# Patient Record
Sex: FEMALE
[De-identification: names, ages, dates, MRNs, and addresses within clinical notes are randomized per-mention and may not be internally consistent; named-entity substitution may affect disease eponyms.]

---

## 2017-05-12 ENCOUNTER — HOSPITAL ENCOUNTER (EMERGENCY)
Dept: HOSPITAL 42 - ED | Age: 42
Discharge: HOME | End: 2017-05-12
Payer: SELF-PAY

## 2017-05-12 VITALS — SYSTOLIC BLOOD PRESSURE: 105 MMHG | DIASTOLIC BLOOD PRESSURE: 59 MMHG | HEART RATE: 69 BPM

## 2017-05-12 VITALS — BODY MASS INDEX: 17.5 KG/M2

## 2017-05-12 VITALS — RESPIRATION RATE: 16 BRPM | TEMPERATURE: 99 F | OXYGEN SATURATION: 100 %

## 2017-05-12 DIAGNOSIS — O03.9: Primary | ICD-10-CM

## 2017-05-12 LAB
ADD MANUAL DIFF?: NO
ALBUMIN/GLOB SERPL: 1.2 {RATIO} (ref 1.1–1.8)
ALP SERPL-CCNC: 43 U/L (ref 38–133)
ALT SERPL-CCNC: 28 U/L (ref 7–56)
APPEARANCE UR: CLEAR
AST SERPL-CCNC: 22 U/L (ref 15–39)
BACTERIA #/AREA URNS HPF: (no result) /[HPF]
BASOPHILS # BLD AUTO: 0.02 K/MM3 (ref 0–2)
BASOPHILS NFR BLD: 0.3 % (ref 0–3)
BILIRUB SERPL-MCNC: 1.2 MG/DL (ref 0.2–1.3)
BILIRUB UR-MCNC: NEGATIVE MG/DL
BUN SERPL-MCNC: 8 MG/DL (ref 7–21)
CALCIUM SERPL-MCNC: 8.9 MG/DL (ref 8.4–10.5)
CHLORIDE SERPL-SCNC: 100 MMOL/L (ref 98–107)
CO2 SERPL-SCNC: 31 MMOL/L (ref 21–33)
COLOR UR: (no result)
EOSINOPHIL # BLD: 0.1 10*3/UL (ref 0–0.7)
EOSINOPHIL NFR BLD: 0.9 % (ref 1.5–5)
ERYTHROCYTE [DISTWIDTH] IN BLOOD BY AUTOMATED COUNT: 12.2 % (ref 11.5–14.5)
GLOBULIN SER-MCNC: 3.5 GM/DL
GLUCOSE SERPL-MCNC: 93 MG/DL (ref 70–110)
GLUCOSE UR STRIP-MCNC: NEGATIVE MG/DL
GRANULOCYTES # BLD: 4.33 10*3/UL (ref 1.4–6.5)
GRANULOCYTES NFR BLD: 63.6 % (ref 50–68)
HCT VFR BLD CALC: 37.5 % (ref 36–48)
KETONES UR STRIP-MCNC: (no result) MG/DL
LEUKOCYTE ESTERASE UR-ACNC: NEGATIVE LEU/UL
LYMPHOCYTES # BLD: 1.8 10*3/UL (ref 1.2–3.4)
LYMPHOCYTES NFR BLD AUTO: 26.2 % (ref 22–35)
MCH RBC QN AUTO: 32.1 PG (ref 25–35)
MCHC RBC AUTO-ENTMCNC: 34.7 G/DL (ref 31–37)
MCV RBC AUTO: 92.6 FL (ref 80–105)
MONOCYTES # BLD AUTO: 0.6 10*3/UL (ref 0.1–0.6)
MONOCYTES NFR BLD: 9 % (ref 1–6)
PH UR STRIP: 6.5 [PH] (ref 4.7–8)
PLATELET # BLD: 206 10^3/UL (ref 120–450)
PMV BLD AUTO: 10.3 FL (ref 7–11)
POTASSIUM SERPL-SCNC: 3.9 MMOL/L (ref 3.6–5)
PROT SERPL-MCNC: 7.6 G/DL (ref 5.8–8.3)
PROT UR STRIP-MCNC: NEGATIVE MG/DL
RBC # UR STRIP: (no result) /UL
RBC #/AREA URNS HPF: (no result) /HPF (ref 0–2)
SODIUM SERPL-SCNC: 137 MMOL/L (ref 132–148)
SP GR UR STRIP: 1.01 (ref 1–1.03)
UROBILINOGEN UR STRIP-ACNC: 0.2 E.U./DL
WBC # BLD AUTO: 6.8 10^3/UL (ref 4.5–11)
WBC #/AREA URNS HPF: NEGATIVE /HPF (ref 0–6)

## 2017-05-12 NOTE — US
EXAM:

  US Pregnancy, Transvaginal



CLINICAL HISTORY:

  42 years old, female; Signs and symptoms; Lmp or gestational age (in weeks): 

03/31/2017; Other: Bleeding; Pregnant; Additional info: Pregnant, vaginal 

bleeding; per patient, positive home pregnancy test



TECHNIQUE:

  Real-time transvaginal obstetrical ultrasound of the maternal pelvis and a 

first trimester pregnancy with image documentation.  Transvaginal imaging was 

used for better evaluation of the fetus and adnexa.



EXAM DATE/TIME:

  5/12/2017 6:53 PM



COMPARISON:

  There are no prior studies for comparison.



FINDINGS:

  

Uterus: Uterus measures approximately 6.4 x 3.4 x 5 cm.  Cervix is closed.There 

is a nabothian cyst in the cervix.  Endometrium measures approximately 12 mm in 

width.  Endometrium is mildly heterogeneous.  There is no intrauterine 

gestation.



Right ovary: Right ovary measures approximately 2.73 x 1.28 x 1.44 cm.  There 

is a corpus luteum in the right ovary. There are multiple small follicles. 

There is expected blood flow on Doppler imaging



Left ovary: Left ovary measures approximately 2.75 x 1.2 x 1.33 cm.There are 

multiple small follicles.  There is intraovarian blood flow.



Adnexa: There are no adnexal masses.  There is no free fluid.



IMPRESSION:  Prominent endometrium, no intrauterine or ectopic gestation 

identified



Correlation with serial beta hCG levels advised

## 2017-05-12 NOTE — ED PDOC
Arrival/HPI





- General


Chief Complaint: Female Genitourinary


Time Seen by Provider: 17 18:53


Historian: Patient





- History of Present Illness


Narrative History of Present Illness (Text): 





17 21:20


Patient reports 2 day history of initially vaginal spotting however today 

developed vaginal bleeding with no clots, like her menstrual period, with no 

abdominal pain. Patient reports positive home pregnancy 1 week ago. Otherwise: (

-) N/V, (-) fever, (-) urinary symptoms, (-) prior salpingitis, (-) prior 

ectopic pregnancy. Has (-) prenatal care and (-) prior OB ultrasound. 





GYN HISTORY:   2 Para 0 AB 1 LNMP 3/31











Past Medical History





- Provider Review


Nursing Documentation Reviewed: Yes





- Infectious Disease


Hx of Infectious Diseases: None





- Tetanus Immunization


Tetanus Immunization: Unknown





- Past Medical History


Past Medical History: No Previous





- Cardiac


Hx Cardiac Disorders: No





- Pulmonary


Hx Respiratory Disorders: No





- Neurological


Hx Neurological Disorder: No





- HEENT


Hx HEENT Disorder: No


Hx Blind: No





- Renal


Hx Renal Disorder: No





- Endocrine/Metabolic


Hx Endocrine Disorders: No





- Hematological/Oncological


Hx Blood Disorders: No





- Integumentary


Hx Dermatological Disorder: No





- Musculoskeletal/Rheumatological


Hx Musculoskeletal Disorders: No





- Gastrointestinal


Hx Gastrointestinal Disorders: No





- Genitourinary/Gynecological


Hx Genitourinary Disorders: No





- Psychiatric


Hx Psychophysiologic Disorder: Yes


Hx Anxiety: Yes


Hx Depression: Yes


Hx Emotional Abuse: No


Hx Physical Abuse: No


Hx Substance Use: No





- Past Surgical History


Past Surgical History: No Previous





- Suicidal Assessment


Feels Threatened In Home Enviroment: No





Family/Social History





- Physician Review


Nursing Documentation Reviewed: Yes


Family/Social History: No Known Family HX


Smoking Status: Heavy Smoker > 10 Cigarettes Daily


Hx Alcohol Use: No


Hx Substance Use: No


Hx Substance Use Treatment: No





Allergies/Home Meds


Allergies/Adverse Reactions: 


Allergies





No Known Allergies Allergy (Verified 17 18:01)


 








Home Medications: 


 Home Meds











 Medication  Instructions  Recorded  Confirmed


 


Unobtainable  17














Review of Systems





- Review of Systems


Constitutional: Normal, Fatigue.  absent: Weight Change, Fevers


Respiratory: Normal.  absent: SOB, Cough, Sputum


Cardiovascular: Normal.  absent: Chest Pain, Palpitations, Edema


Gastrointestinal: Normal.  absent: Abdominal Pain, Stool Changes, Appetite 

Changes


Genitourinary Female: Normal, Vaginal Bleeding.  absent: Dysuria, Frequency, 

Hematuria


Musculoskeletal: Normal.  absent: Arthralgias, Back Pain, Neck Pain


Skin: Normal.  absent: Rash, Pruritis, Skin Lesions





Physical Exam





- Physical Exam


Narrative Physical Exam (Text): 





17 21:22


GENERAL APPEARANCE: Patient is awake, alert, oriented x 3, in no acute 

distress. 


SKIN:  Warm, dry; (-) cyanosis.


EYES:  (-) conjunctival pallor.


ENMT:  Mucous membranes _ moist.


NECK:  (-) tenderness, (-) stiffness, (-) lymphadenopathy.


CHEST AND RESPIRATORY:  (-) rales, (-) rhonchi, (-) wheezes; breath sounds 

equal bilaterally.


HEART AND CARDIOVASCULAR:  (-) irregularity; (-) murmur, (-) gallop.


ABDOMEN AND GI:  Soft; (-) tenderness.


EXTREMITIES:  (-) deformity.


NEURO AND PSYCH:  Mental status as above; (-) focal findings.


Vital Signs











  Temp Pulse Resp BP Pulse Ox


 


 17 21:56   69  16  105/59 L  100


 


 17 18:04  99 F  78  16  106/71  100














Medical Decision Making


ED Course and Treatment: 





17 21:23


43 yo F , presents with vaginal bleeding with no clots and no abdominal 

pain. 








Plan:


-- Labs


-- Urinalysis


-- Reassess and disposition


-- TV US  








17 21:00


Labs reviewed. US shows no IUP, no ectopic. 


Based on history, exam and diagnostic results plan will be for outpatient 

follow up, patient advised to return to the ER after 2 days for repeat beta 

quant.  





Patient states she fully agrees with and understands discharge instructions. 

States that she agrees with the plan and disposition. Verbalized and repeated 

discharge instructions and plan. I have given the patient opportunity to ask 

any additional questions. 





Patient advised to return to the ER after 2 days without fail. Return to the 

emergency room at any time for any new or worsening symptoms.








Re-evaluation Time: 21:00


Reassessment Condition: Re-examined, Unchanged (Patient laying in bed in no 

acute distress. Reports no abdominal pain, no increase in vaginal bleeding with 

no clots. Abdomen remains soft with no tenderness. )





- Lab Interpretations


Lab Results: 








 17 19:15 





 17 19:15 





 Lab Results





17 20:30: Urine Color Light yellow, Urine Appearance Clear, Urine pH 6.5, 

Ur Specific Gravity 1.010, Urine Protein Negative, Urine Glucose (UA) Negative, 

Urine Ketones Trace H, Urine Blood Large H, Urine Nitrate Negative, Urine 

Bilirubin Negative, Urine Urobilinogen 0.2, Ur Leukocyte Esterase Negative, 

Urine RBC 0 - 2, Urine WBC Negative, Ur Epithelial Cells 1 - 3, Urine Bacteria 

Neg


17 19:45: Blood Type Confirm A POSITIVE


17 19:22: Beta HCG, Quant 1011.60 H


17 19:15: Blood Type A POSITIVE, Antibody Screen Positive, Antibody 

Identification Non Specific Antibody, BBK History Checked No verified bt


17 19:15: Sodium 137, Potassium 3.9, Chloride 100, Carbon Dioxide 31, 

Anion Gap 10, BUN 8, Creatinine 0.6, Est GFR (African Amer) > 60, Est GFR (Non-

Af Amer) > 60, Random Glucose 93, Calcium 8.9, Total Bilirubin 1.2, AST 22, ALT 

28, Alkaline Phosphatase 43, Total Protein 7.6, Albumin 4.1, Globulin 3.5, 

Albumin/Globulin Ratio 1.2


17 19:15: WBC 6.8, RBC 4.05, Hgb 13.0, Hct 37.5, MCV 92.6, MCH 32.1, MCHC 

34.7, RDW 12.2, Plt Count 206, MPV 10.3, Gran % 63.6, Lymph % (Auto) 26.2, Mono 

% (Auto) 9.0 H, Eos % (Auto) 0.9 L, Baso % (Auto) 0.3, Gran # 4.33, Lymph # 1.8

, Mono # 0.6, Eos # 0.1, Baso # 0.02








I have reviewed the lab results: Yes


Interpretation: All labs normal





- RAD Interpretation


Narrative RAD Interpretations (Text): 





EXAM:


US Pregnancy, Transvaginal


CLINICAL HISTORY:


42 years old, female; Signs and symptoms; Lmp or gestational age (in weeks): ; Other:


Bleeding; Pregnant; Additional info: Pregnant, vaginal bleeding; per patient, 

positive home pregnancy


test


TECHNIQUE:


Real-time transvaginal obstetrical ultrasound of the maternal pelvis and a 

first trimester pregnancy


with image documentation. Transvaginal imaging was used for better evaluation 

of the fetus and


adnexa.


EXAM DATE/TIME:


2017 6:53 PM


COMPARISON:


There are no prior studies for comparison.


FINDINGS:


Uterus: Uterus measures approximately 6.4 x 3.4 x 5 cm. Cervix is closed.There 

is a nabothian cyst


in the cervix. Endometrium measures approximately 12 mm in width. Endometrium 

is mildly


heterogeneous. There is no intrauterine gestation.


Right ovary: Right ovary measures approximately 2.73 x 1.28 x 1.44 cm. There is 

a corpus luteum in


the right ovary. There are multiple small follicles. There is expected blood 

flow on Doppler imaging


Left ovary: Left ovary measures approximately 2.75 x 1.2 x 1.33 cm.There are 

multiple small follicles.


There is intraovarian blood flow.


Adnexa: There are no adnexal masses. There is no free fluid.


IMPRESSION: Prominent endometrium, no intrauterine or ectopic gestation 

identified


Correlation with serial beta hCG levels advised


Thank you for allowing us to participate in the care of your patient.


Dictated and Authenticated by: Lila Anaya MD


2017 8:18 PM Eastern Time ([place] & [place])


Radiology Orders: 








17 18:53


OB TRANSVAGINAL PREGNANCY [US] Stat 














- PA / NP / Resident Statement


MD/DO has reviewed & agrees with the documentation as recorded.





Disposition/Present on Arrival





- Present on Arrival


Any Indicators Present on Arrival: No


History of DVT/PE: No


History of Uncontrolled Diabetes: No


Urinary Catheter: No


History of Decub. Ulcer: No


History Surgical Site Infection Following: None





- Disposition


Have Diagnosis and Disposition been Completed?: Yes


Diagnosis: 


 Pregnancy, Miscarriage





Disposition: HOME/ ROUTINE


Disposition Time: 21:00


Patient Plan: Discharge


Condition: GOOD


Discharge Instructions (ExitCare):  Threatened Miscarriage (ED), Pregnancy (ED)


Print Language: ENGLISH


Additional Instructions: 


Thank you for letting us take care of you today. You were treated for pregnant, 

vaginal bleeding, consider miscarriage. The emergency medical care you received 

today was directed at your acute symptoms.  Return to the ER after 2 days (5/15/

17) without fail for repeat beta quant.  Return to the Emergency Department if 

your symptoms worsen, do not improve, or if you have any other problems.





Thank you for allowing the [facility] team to be part of your care today.





Referrals: 


Sharif Cain MD [Primary Care Provider] - Follow up with primary

## 2018-05-21 ENCOUNTER — HOSPITAL ENCOUNTER (EMERGENCY)
Dept: HOSPITAL 42 - ED | Age: 43
Discharge: HOME | End: 2018-05-21
Payer: MEDICAID

## 2018-05-21 VITALS
OXYGEN SATURATION: 98 % | DIASTOLIC BLOOD PRESSURE: 59 MMHG | SYSTOLIC BLOOD PRESSURE: 99 MMHG | RESPIRATION RATE: 18 BRPM | HEART RATE: 88 BPM

## 2018-05-21 VITALS — TEMPERATURE: 97.9 F

## 2018-05-21 VITALS — BODY MASS INDEX: 17.5 KG/M2

## 2018-05-21 DIAGNOSIS — F11.23: Primary | ICD-10-CM

## 2018-05-21 PROCEDURE — 99283 EMERGENCY DEPT VISIT LOW MDM: CPT

## 2018-05-21 PROCEDURE — 96372 THER/PROPH/DIAG INJ SC/IM: CPT

## 2018-05-21 NOTE — ED PDOC
Arrival/HPI





- General


Chief Complaint: Medical Clearance


Time Seen by Provider: 05/21/18 03:13


Historian: Patient





- History of Present Illness


Narrative History of Present Illness (Text): 


05/21/18 03:24


Lisa Bishop is a 43 year old female, whose past medical history includes 

substance abuse, who presents to the Emergency department complaining of 

shakiness. Patient states she recently stopped snorting heroin 3 days go and is 

now experiencing generalized shakiness. Patient also states she took Trazodone 

tonight. Patient states she would like to go to detox. Patient denies any fever

, chills, chest pain, shortness of breath, nausea, vomiting, diarrhea, urinary 

symptoms, back pain, neck pain, headache, dizziness, or any other complaints.





Symptom Onset: Gradual


Symptom Course: Unchanged


Activities at Onset: Light


Context: Exertion





Past Medical History





- Provider Review


Nursing Documentation Reviewed: Yes





- Infectious Disease


Hx of Infectious Diseases: None





- Tetanus Immunization


Tetanus Immunization: Unknown





- Past Medical History


Past Medical History: No Previous





- Cardiac


Hx Cardiac Disorders: No





- Pulmonary


Hx Respiratory Disorders: No





- Neurological


Hx Neurological Disorder: No





- HEENT


Hx HEENT Disorder: No


Hx Blind: No





- Renal


Hx Renal Disorder: No





- Endocrine/Metabolic


Hx Endocrine Disorders: No





- Hematological/Oncological


Hx Blood Disorders: No





- Integumentary


Hx Dermatological Disorder: No





- Musculoskeletal/Rheumatological


Hx Musculoskeletal Disorders: No





- Gastrointestinal


Hx Gastrointestinal Disorders: No





- Genitourinary/Gynecological


Hx Genitourinary Disorders: No





- Psychiatric


Hx Psychophysiologic Disorder: Yes


Hx Anxiety: Yes


Hx Depression: Yes


Hx Emotional Abuse: No


Hx Physical Abuse: No


Hx Substance Use: Yes (Herion)





- Past Surgical History


Past Surgical History: No Previous





- Anesthesia


Hx Anesthesia: No





- Suicidal Assessment


Feels Threatened In Home Enviroment: No





Family/Social History





- Physician Review


Nursing Documentation Reviewed: Yes


Family/Social History: Unknown Family HX


Smoking Status: Light Smoker < 10 Cigarettes Daily


Hx Alcohol Use: No


Hx Substance Use: Yes (Herion)


Hx Substance Use Treatment: No





Allergies/Home Meds


Allergies/Adverse Reactions: 


Allergies





No Known Allergies Allergy (Verified 05/21/18 03:23)


 








Home Medications: 


 Home Meds











 Medication  Instructions  Recorded  Confirmed


 


No Known Home Med  05/21/18 05/21/18














Review of Systems





- Physician Review


All systems were reviewed & negative as marked: Yes





- Review of Systems


Constitutional: Other (+shaky).  absent: Fevers


Eyes: Normal


ENT: Normal


Respiratory: Normal.  absent: SOB, Cough


Cardiovascular: Normal.  absent: Chest Pain


Gastrointestinal: Normal.  absent: Abdominal Pain, Diarrhea, Nausea, Vomiting


Genitourinary Female: Normal.  absent: Dysuria, Frequency, Hematuria, Urine 

Output Changes


Musculoskeletal: Normal.  absent: Back Pain, Neck Pain


Skin: Normal.  absent: Rash


Neurological: Normal.  absent: Headache, Dizziness


Endocrine: Normal


Hemo/Lymphatic: Normal





Physical Exam


Vital Signs Reviewed: Yes


Vital Signs











  Temp Pulse Resp BP Pulse Ox


 


 05/21/18 03:18  97.7 F  42 L  17  119/77  99











Temperature: Afebrile


Blood Pressure: Normal


Pulse: Regular


Respiratory Rate: Normal


Appearance: Positive for: Well-Appearing, Non-Toxic, Comfortable


Pain Distress: None


Mental Status: Positive for: Alert and Oriented X 3





- Systems Exam


Head: Present: Atraumatic, Normocephalic


Pupils: Present: PERRL


Extroacular Muscles: Present: EOMI


Conjunctiva: Present: Normal


Mouth: Present: Moist Mucous Membranes


Neck: Present: Normal Range of Motion


Respiratory/Chest: Present: Clear to Auscultation, Good Air Exchange.  No: 

Respiratory Distress, Accessory Muscle Use


Cardiovascular: Present: Regular Rate and Rhythm, Normal S1, S2.  No: Murmurs


Abdomen: No: Tenderness, Distention, Peritoneal Signs


Back: Present: Normal Inspection


Upper Extremity: Present: Normal Inspection.  No: Cyanosis, Edema


Lower Extremity: Present: Normal Inspection.  No: Edema


Neurological: Present: GCS=15, CN II-XII Intact, Speech Normal


Skin: Present: Warm, Dry, Normal Color.  No: Rashes


Psychiatric: Present: Alert, Oriented x 3, Normal Insight, Normal Concentration





Medical Decision Making


ED Course and Treatment: 


05/21/18 03:24


Impression:


43 year old female complaining of shakiness, has not used heroin 3 days.





Plan:


-- Ativan


-- Reassess and disposition





Progress Notes:


05/21/18 06:24


On re-evaluation, patient feels better and is in no acute distress. I have 

discussed the results and plan with the patient, who expresses understanding. 

Patient in agreement with plan to be discharged home. Patient is stable for 

discharge. Patient was instructed to follow up with physician or return if 

symptoms worsen or new concerning symptoms arise.








- Medication Orders


Current Medication Orders: 











Discontinued Medications





Lorazepam (Ativan)  2 mg IM ONCE ONE


   PRN Reason: Protocol


   Stop: 05/21/18 03:25


   Last Admin: 05/21/18 03:47  Dose: 2 mg





IM Administration Charges


 Document     05/21/18 03:47  NELLA  (Rec: 05/21/18 03:47  NELLA  [id number])


     Injection Site


      MAR Injection Site                         Left Gluteus Andrea


     Charges for Administration


      # of IM Administrations                    1














- Scribe Statement


The provider has reviewed the documentation as recorded by the Aramis Cabrera





Provider Scribe Attestation:


All medical record entries made by the Scribe were at my direction and 

personally dictated by me. I have reviewed the chart and agree that the record 

accurately reflects my personal performance of the history, physical exam, 

medical decision making, and the department course for this patient. I have 

also personally directed, reviewed, and agree with the discharge instructions 

and disposition.








Disposition/Present on Arrival





- Present on Arrival


History of DVT/PE: No


History of Uncontrolled Diabetes: No


Urinary Catheter: No


History of Decub. Ulcer: No


History Surgical Site Infection Following: None





- Disposition


Diagnosis: 


 Heroin withdrawal





Disposition: HOME/ ROUTINE


Disposition Time: 06:25


Patient Problems: 


 Current Active Problems











Problem Status Onset


 


Heroin withdrawal Acute  











Discharge Instructions (ExitCare):  Prescription Drug Abuse (DC)


Forms:  [facility] (English)

## 2018-06-01 ENCOUNTER — HOSPITAL ENCOUNTER (EMERGENCY)
Dept: HOSPITAL 42 - ED | Age: 43
Discharge: LEFT BEFORE DISCHARGE | End: 2018-06-01
Payer: MEDICAID

## 2018-06-01 VITALS — BODY MASS INDEX: 17.5 KG/M2

## 2018-06-01 VITALS — OXYGEN SATURATION: 100 % | HEART RATE: 82 BPM

## 2018-06-01 VITALS — TEMPERATURE: 98.4 F | RESPIRATION RATE: 18 BRPM | DIASTOLIC BLOOD PRESSURE: 81 MMHG | SYSTOLIC BLOOD PRESSURE: 99 MMHG

## 2018-06-01 DIAGNOSIS — Z02.89: Primary | ICD-10-CM

## 2018-06-01 DIAGNOSIS — F10.10: ICD-10-CM

## 2018-06-05 ENCOUNTER — HOSPITAL ENCOUNTER (INPATIENT)
Dept: HOSPITAL 31 - C.ER | Age: 43
LOS: 6 days | Discharge: HOME | DRG: 745 | End: 2018-06-11
Attending: PSYCHIATRY & NEUROLOGY | Admitting: PSYCHIATRY & NEUROLOGY
Payer: MEDICAID

## 2018-06-05 VITALS — BODY MASS INDEX: 17.5 KG/M2

## 2018-06-05 DIAGNOSIS — F32.1: ICD-10-CM

## 2018-06-05 DIAGNOSIS — F14.20: ICD-10-CM

## 2018-06-05 DIAGNOSIS — G47.00: ICD-10-CM

## 2018-06-05 DIAGNOSIS — Z59.0: ICD-10-CM

## 2018-06-05 DIAGNOSIS — F41.1: ICD-10-CM

## 2018-06-05 DIAGNOSIS — F11.23: Primary | ICD-10-CM

## 2018-06-05 LAB
ALBUMIN SERPL-MCNC: 3.6 G/DL (ref 3.5–5)
ALBUMIN/GLOB SERPL: 1.1 {RATIO} (ref 1–2.1)
ALT SERPL-CCNC: 26 U/L (ref 9–52)
AST SERPL-CCNC: 24 U/L (ref 14–36)
BACTERIA #/AREA URNS HPF: (no result) /[HPF]
BASOPHILS # BLD AUTO: 0 K/UL (ref 0–0.2)
BASOPHILS NFR BLD: 0.9 % (ref 0–2)
BILIRUB UR-MCNC: NEGATIVE MG/DL
BUN SERPL-MCNC: 8 MG/DL (ref 7–17)
CALCIUM SERPL-MCNC: 8.8 MG/DL (ref 8.6–10.4)
EOSINOPHIL # BLD AUTO: 0.1 K/UL (ref 0–0.7)
EOSINOPHIL NFR BLD: 2 % (ref 0–4)
ERYTHROCYTE [DISTWIDTH] IN BLOOD BY AUTOMATED COUNT: 13.6 % (ref 11.5–14.5)
GFR NON-AFRICAN AMERICAN: > 60
GLUCOSE UR STRIP-MCNC: NORMAL MG/DL
HCG,QUALITATIVE URINE: NEGATIVE
HGB BLD-MCNC: 13.2 G/DL (ref 11–16)
LEUKOCYTE ESTERASE UR-ACNC: (no result) LEU/UL
LYMPHOCYTES # BLD AUTO: 0.7 K/UL (ref 1–4.3)
LYMPHOCYTES NFR BLD AUTO: 18.2 % (ref 20–40)
MCH RBC QN AUTO: 32 PG (ref 27–31)
MCHC RBC AUTO-ENTMCNC: 34.4 G/DL (ref 33–37)
MCV RBC AUTO: 92.8 FL (ref 81–99)
MONOCYTES # BLD: 0.3 K/UL (ref 0–0.8)
MONOCYTES NFR BLD: 8.7 % (ref 0–10)
NEUTROPHILS # BLD: 2.6 K/UL (ref 1.8–7)
NEUTROPHILS NFR BLD AUTO: 70.2 % (ref 50–75)
NRBC BLD AUTO-RTO: 0.1 % (ref 0–2)
PH UR STRIP: 5 [PH] (ref 5–8)
PLATELET # BLD: 181 K/UL (ref 130–400)
PMV BLD AUTO: 8.7 FL (ref 7.2–11.7)
PROT UR STRIP-MCNC: NEGATIVE MG/DL
RBC # BLD AUTO: 4.13 MIL/UL (ref 3.8–5.2)
RBC # UR STRIP: NEGATIVE /UL
SP GR UR STRIP: 1.02 (ref 1–1.03)
SQUAMOUS EPITHIAL: 3 /HPF (ref 0–5)
UROBILINOGEN UR-MCNC: NORMAL MG/DL (ref 0.2–1)
WBC # BLD AUTO: 3.8 K/UL (ref 4.8–10.8)

## 2018-06-05 PROCEDURE — HZ59ZZZ INDIVIDUAL PSYCHOTHERAPY FOR SUBSTANCE ABUSE TREATMENT, SUPPORTIVE: ICD-10-PCS | Performed by: PSYCHIATRY & NEUROLOGY

## 2018-06-05 PROCEDURE — HZ2ZZZZ DETOXIFICATION SERVICES FOR SUBSTANCE ABUSE TREATMENT: ICD-10-PCS | Performed by: PSYCHIATRY & NEUROLOGY

## 2018-06-05 PROCEDURE — GZ58ZZZ INDIVIDUAL PSYCHOTHERAPY, COGNITIVE-BEHAVIORAL: ICD-10-PCS | Performed by: PSYCHIATRY & NEUROLOGY

## 2018-06-05 PROCEDURE — HZ56ZZZ INDIVIDUAL PSYCHOTHERAPY FOR SUBSTANCE ABUSE TREATMENT, PSYCHOEDUCATION: ICD-10-PCS | Performed by: PSYCHIATRY & NEUROLOGY

## 2018-06-05 PROCEDURE — HZ52ZZZ INDIVIDUAL PSYCHOTHERAPY FOR SUBSTANCE ABUSE TREATMENT, COGNITIVE-BEHAVIORAL: ICD-10-PCS | Performed by: PSYCHIATRY & NEUROLOGY

## 2018-06-05 PROCEDURE — GZHZZZZ GROUP PSYCHOTHERAPY: ICD-10-PCS | Performed by: PSYCHIATRY & NEUROLOGY

## 2018-06-05 PROCEDURE — GZ56ZZZ INDIVIDUAL PSYCHOTHERAPY, SUPPORTIVE: ICD-10-PCS | Performed by: PSYCHIATRY & NEUROLOGY

## 2018-06-05 PROCEDURE — HZ42ZZZ GROUP COUNSELING FOR SUBSTANCE ABUSE TREATMENT, COGNITIVE-BEHAVIORAL: ICD-10-PCS | Performed by: PSYCHIATRY & NEUROLOGY

## 2018-06-05 PROCEDURE — HZ46ZZZ GROUP COUNSELING FOR SUBSTANCE ABUSE TREATMENT, PSYCHOEDUCATION: ICD-10-PCS | Performed by: PSYCHIATRY & NEUROLOGY

## 2018-06-05 RX ADMIN — AMOXICILLIN AND CLAVULANATE POTASSIUM SCH TAB: 500; 125 TABLET, FILM COATED ORAL at 19:12

## 2018-06-05 SDOH — ECONOMIC STABILITY - HOUSING INSECURITY: HOMELESSNESS: Z59.0

## 2018-06-05 NOTE — C.PDOC
History Of Present Illness





<AdamsJonatanDaisy L - Last Filed: 06/05/18 14:00>





<Hunter Bowen - Last Filed: 06/07/18 14:16>


42 y/o female presents to ED requesting detox from heroin. Patient reports last 

used was last night and currently denies ETOH use, signs of withdrawal, SI/HI 

or any other complaints at this time.  (Daisy Adams)


History Per: Patient


History/Exam Limitations: no limitations


Onset/Duration Of Symptoms: Days


Current Symptoms Are (Timing): Still Present


Suicide/Self Injury Attempted (Context): None





<BryanJonatanDaisy L - Last Filed: 06/05/18 14:00>





<Hunter Bowen - Last Filed: 06/07/18 14:16>


Time Seen by Provider: 06/05/18 09:37


Chief Complaint (Nursing): Substance Abuse





Past Medical History


Reviewed: Historical Data, Nursing Documentation, Vital Signs





- Medical History


PMH: Anxiety, Depression


Surgical History: No Surg Hx


Family History: States: No Known Family Hx





- Social History


Hx Alcohol Use: No


Hx Substance Use: Yes





- Immunization History


Hx Tetanus Toxoid Vaccination: No


Hx Influenza Vaccination: No


Hx Pneumococcal Vaccination: No





<AdamsJonatanDaisy L - Last Filed: 06/05/18 14:00>


Vital Signs: 





 Last Vital Signs











Temp  98.1 F   06/07/18 13:28


 


Pulse  60   06/07/18 13:28


 


Resp  18   06/07/18 13:28


 


BP  107/67   06/07/18 13:28


 


Pulse Ox  99   06/07/18 13:28














- [facility] Procedures











INJECT/INFUSE NEC (09/09/14)











Review Of Systems


Constitutional: Negative for: Fever, Chills


Cardiovascular: Negative for: Chest Pain


Respiratory: Negative for: Shortness of Breath


Gastrointestinal: Negative for: Nausea, Vomiting


Skin: Negative for: Rash





<BryanJonatanDaisy L - Last Filed: 06/05/18 14:00>





Physical Exam





- Physical Exam


Appears: Non-toxic, No Acute Distress


Skin: Warm, Dry, No Rash


Head: Atraumatic, Normacephalic


Eye(s): bilateral: PERRL, EOMI


Oral Mucosa: Moist


Neck: Normal ROM, Supple


Cardiovascular: Rhythm Regular


Respiratory: No Rales, No Rhonchi, No Wheezing


Gastrointestinal/Abdominal: Soft, No Tenderness, No Guarding, No Rebound


Neurological/Psych: Oriented x3, Normal Speech, Normal Cognition, Other (No 

tremors)





<BryanJonatanDaisy L - Last Filed: 06/05/18 14:00>





ED Course And Treatment





- Laboratory Results


Result Diagrams: 


 06/05/18 10:24





 06/05/18 10:24


O2 Sat by Pulse Oximetry: 98 (RA)


Pulse Ox Interpretation: Normal





<AdamsJonatanDaisy L - Last Filed: 06/05/18 14:00>





- Laboratory Results


Result Diagrams: 


 06/05/18 10:24





 06/05/18 10:24





<Hunter Bowen - Last Filed: 06/07/18 14:16>





Medical Decision Making





<BryanDaisy L - Last Filed: 06/05/18 14:00>





<Hunter Bowen - Last Filed: 06/07/18 14:16>


Medical Decision Making: 


Patient requesting detox from heroin. 


Labs ordered for medical clearance


Labs reviewed with no acute findings. In my clinical judgment patient is 

medically cleared and stable for admission.


[facility] contacted for evaluation. As per JADA Jordan patient is to be admitted under 

Dr Denis service for detox (Daisy Adams)





Disposition





- Disposition


Disposition Time: 10:53





- POA


Present On Arrival: None





<Daisy Adams - Last Filed: 06/05/18 14:00>





<Hunter Bowen - Last Filed: 06/07/18 14:16>





- Disposition


Disposition: HOSPITALIZED


Condition: GOOD





- Clinical Impression


Clinical Impression: 


 Heroin use disorder, severe








- PA / NP / Resident Statement


MD/ has reviewed & agrees with the documentation as recorded.





- Scribe Statement


The provider has reviewed the documentation as recorded by the Scribe





<Daisy Adams - Last Filed: 06/05/18 14:00>





- PA / NP / Resident Statement


MD/ has reviewed & agrees with the documentation as recorded.





<Hunter Bowen - Last Filed: 06/07/18 14:16>





- Scribe Statement


Roel Christianson





All medical record entries made by the Scribe were at my direction and 

personally dictated by me. I have reviewed the chart and agree that the record 

accurately reflects my personal performance of the history, physical exam, 

medical decision making, and the department course for this patient. I have 

also personally directed, reviewed, and agree with the discharge instructions 

and disposition. (Daisy Adams)





Decision To Admit





- Pt Status Changed To:


Hospital Disposition Of: Inpatient





- Admit Certification


Admit to Inpatient:: After my assessment, the patient will require 

hospitalization for at least two midnights.  This is because of the severity of 

symptoms shown, intensity of services needed, and/or the medical risk in this 

patient being treated as an outpatient.





- InPatient:


Physician Admission Certification: I certify that this patient requires 2 or 

more midnights of care for the following reason:: patient needs inpatient detox 

for heroin use





- .


Bed Request Type: Detox


Admitting Physician: Gabriel Denis





<Daisy Adams - Last Filed: 06/05/18 14:00>





<Hunter Bowen - Last Filed: 06/07/18 14:16>





- .


Patient Diagnosis: 


 Heroin use disorder, severe

## 2018-06-05 NOTE — PCM.BM
<Hannah Fuentes - Last Filed: 06/05/18 12:07>





Treatment Plan Problems





- Problems identified on initial assessmt


  ** Potential for opioid withdrawal


Date Initiated: 06/05/18


Assessment reference: NA


Status: Active





  ** Potential for benzodiazepine withdrawal


Date Initiated: 06/05/18


Assessment reference: NA


Status: Active





Treatment assets and liabiliti


Patient Assests: cooperative, educated, self-reliant, ADL independent


Patient Liabilities: financial problems, poor support system, relationship 

conflicts, substance abuse





- Milieu Protocol


Maintain good personal hygiene: every shift Encourage regular showers, every 

shift Remind patient to perform daily oral care, every shift Assist patient to 

perform ADL's


Maintain personal safety: every shift Educate patient to report safety concerns 

to staff, every shift Monitor environment for contraband/sharps


Medication safety: Monitor for expected outcome, potential side effects: every 

shift, Assess barriers to learning: every shift, Assess readiness for 

medication education: every shift





<Gabriel Denis - Last Filed: 06/07/18 08:52>





- Diagnosis


(1) Opioid use disorder, severe, dependence


Status: Acute   


Interventions: 





06/07/18 08:52


* Assess 7x/week regarding severity of withdrawal


* Educate regarding risks, benefits, side effects and alternatives of 

medications


* Use Motivational Interviewing for abstinence


* Use CBT for relapse prevention


* Medication management for withdrawal symptoms


* Encourage medication assisted treatment


*

## 2018-06-06 RX ADMIN — AMOXICILLIN AND CLAVULANATE POTASSIUM SCH TAB: 500; 125 TABLET, FILM COATED ORAL at 19:09

## 2018-06-06 RX ADMIN — AMOXICILLIN AND CLAVULANATE POTASSIUM SCH TAB: 500; 125 TABLET, FILM COATED ORAL at 08:27

## 2018-06-06 NOTE — PCM.PYCHPN
Psychiatric Progress Note





- Psychiatric Progress Note


Patient seen today, length of contact: 16 min


Patient Chief Complaint: 





"I'm not well"


Problems Identified/Issues Discussed: 





The pt is seen, chart reviewed, case discussed with staff.


The pt is compliant with medications and reports no side-effects.


Symptoms are improving but needs more time to stabilize. 


After care discussed, support and psychoeducation given.


Medication Change: Yes (detox changes daily)


Medical Record Reviewed: Yes





Mental Status Examination





- Cognitive Function


Orientation: Person, Place, Situation, Time


Memory: Intact


Attention: Poor


Concentration: Poor


Association: WNL


Fund of Knowledge: WNL





- Mood


Mood: Depressed, Anxious





- Affect


Affect: Broad





- Speech


Speech: Appropriate





- Formal Thought Process


Formal Thought Process: No Impairment





- Suicidal Ideation


Suicidal Ideation: No





- Homicidal Ideation


Homicidal Ideation: No





Goal/Treatment Plan





- Goal/Treatment Plan


Need for Continued Stay: Discharge may exacerbated symptoms, Severe functional 

impairment


Progress Toward Problem(s) and Goals/Treatment Plan: 





Taper with methadone


Gabapentin for augmentation 


Lexapro for FILOMENA and depression


CBT and supportive therapy for FILOMENA and depression


As needed medications


All risks, benefits and alternatives of the meds discussed,


 and the pt agreed and understood. 


Attend groups and activities


Supportive therapy and psychoeducation for substance use


MI for abstinence


CBT for relapse prevention


Encourage MAT


Refer to rehab or IOP, and self-help groups





Estimated Date of D/C: 06/11/18

## 2018-06-07 RX ADMIN — AMOXICILLIN AND CLAVULANATE POTASSIUM SCH TAB: 500; 125 TABLET, FILM COATED ORAL at 18:10

## 2018-06-07 RX ADMIN — AMOXICILLIN AND CLAVULANATE POTASSIUM SCH TAB: 500; 125 TABLET, FILM COATED ORAL at 08:05

## 2018-06-07 NOTE — PCM.PYCHPN
Psychiatric Progress Note





- Psychiatric Progress Note


Patient seen today, length of contact: 17 min


Patient Chief Complaint: 





"I regret starting methadone"


Problems Identified/Issues Discussed: 





The pt is seen, chart reviewed, case discussed with staff.


Support and psychoeducation given, CBT and MI used briefly


No new symptoms reported, improving slowly and needs more time


She thinks subutex would have helped ore bc she is "still withdrawing" and it 

shows, she is somewhat sick


Detox is extended


No SEs from medications, risks discussed.


After care discussed - rehab after detox





Medication Change: Yes (detox changes daily)


Medical Record Reviewed: Yes





Mental Status Examination





- Cognitive Function


Orientation: Person, Place, Situation, Time


Memory: Intact


Attention: Poor


Concentration: Poor


Association: WNL


Fund of Knowledge: WNL





- Mood


Mood: Depressed, Anxious





- Affect


Affect: Broad





- Speech


Speech: Appropriate





- Formal Thought Process


Formal Thought Process: No Impairment





- Suicidal Ideation


Suicidal Ideation: No





- Homicidal Ideation


Homicidal Ideation: No





Goal/Treatment Plan





- Goal/Treatment Plan


Need for Continued Stay: Discharge may exacerbated symptoms, Severe functional 

impairment


Progress Toward Problem(s) and Goals/Treatment Plan: 





Taper with methadone


Gabapentin for augmentation 


Lexapro for FILOMENA and depression, dose increased


CBT and supportive therapy for FILOMENA and depression


As needed medications


All risks, benefits and alternatives of the meds discussed,


 and the pt agreed and understood. 


Attend groups and activities


Supportive therapy and psychoeducation for substance use


MI for abstinence


CBT for relapse prevention


Encourage MAT


Refer to rehab or IOP, and self-help groups





Estimated Date of D/C: 06/11/18

## 2018-06-08 RX ADMIN — AMOXICILLIN AND CLAVULANATE POTASSIUM SCH TAB: 500; 125 TABLET, FILM COATED ORAL at 07:19

## 2018-06-08 RX ADMIN — AMOXICILLIN AND CLAVULANATE POTASSIUM SCH TAB: 500; 125 TABLET, FILM COATED ORAL at 18:06

## 2018-06-08 NOTE — PCM.PYCHPN
Psychiatric Progress Note





- Psychiatric Progress Note


Patient seen today, length of contact: 16 min


Patient Chief Complaint: 





"I couldn't sleep"


Problems Identified/Issues Discussed: 


The pt is seen, chart reviewed, case discussed with staff.


The pt is compliant with medications and reports no side-effects.


Symptoms are improving but needs more time to stabilize. 


After care discussed, support and psychoeducation given.


Seroquel added for insomnia


Medication Change: Yes (detox changes daily)


Medical Record Reviewed: Yes





Mental Status Examination





- Cognitive Function


Orientation: Person, Place, Situation, Time


Memory: Intact


Attention: Poor


Concentration: Poor


Association: WNL


Fund of Knowledge: WNL





- Mood


Mood: Depressed, Anxious





- Affect


Affect: Broad





- Speech


Speech: Appropriate





- Formal Thought Process


Formal Thought Process: No Impairment





- Suicidal Ideation


Suicidal Ideation: No





- Homicidal Ideation


Homicidal Ideation: No





Goal/Treatment Plan





- Goal/Treatment Plan


Need for Continued Stay: Discharge may exacerbated symptoms, Severe functional 

impairment


Progress Toward Problem(s) and Goals/Treatment Plan: 





Taper with methadone


Gabapentin for augmentation 


Lexapro for FILOMENA and depression, dose increased


CBT and supportive therapy for FILOMENA and depression


As needed medications


All risks, benefits and alternatives of the meds discussed,


 and the pt agreed and understood. 


Attend groups and activities


Supportive therapy and psychoeducation for substance use


MI for abstinence


CBT for relapse prevention


Encourage MAT


Refer to rehab or IOP, and self-help groups





Estimated Date of D/C: 06/11/18

## 2018-06-08 NOTE — RAD
HISTORY:

Substance abuse. Rehab clearance.  



COMPARISON:

No prior.



TECHNIQUE:

Chest PA and lateral



FINDINGS:



LUNGS:

No active pulmonary disease.



PLEURA:

No significant pleural effusion identified. No pneumothorax apparent.



CARDIOVASCULAR:

Normal.



OSSEOUS STRUCTURES:

No significant abnormalities.



VISUALIZED UPPER ABDOMEN:

Normal.



OTHER FINDINGS:

None.



IMPRESSION:

No active disease.

## 2018-06-09 RX ADMIN — AMOXICILLIN AND CLAVULANATE POTASSIUM SCH TAB: 500; 125 TABLET, FILM COATED ORAL at 19:00

## 2018-06-09 RX ADMIN — AMOXICILLIN AND CLAVULANATE POTASSIUM SCH TAB: 500; 125 TABLET, FILM COATED ORAL at 06:44

## 2018-06-09 NOTE — PCM.PYCHPN
Psychiatric Progress Note





- Psychiatric Progress Note


Patient seen today, length of contact: 16 min


Patient Chief Complaint: 





"I have withdrawal symptoms"


Problems Identified/Issues Discussed: 





The pt is seen, chart reviewed, case discussed with staff.


The pt is compliant with medications and reports no side-effects.


Symptoms are improving but needs more time to stabilize.


After care discussed, support and psychoeducation given.


DSM 5 Symptoms Update: 





Opioid dependence, severe, 


Opioid withdrawal symptoms


Medication Change: Yes (detox changes daily)


Medical Record Reviewed: Yes





Mental Status Examination





- Cognitive Function


Orientation: Person, Place, Situation, Time


Memory: Intact


Attention: Poor


Concentration: Poor


Association: WNL


Fund of Knowledge: WNL


Decription of patient's judgement and insights: 





fair/fair





- Mood


Mood: Depressed, Anxious





- Affect


Affect: Broad





- Speech


Speech: Appropriate





- Formal Thought Process


Formal Thought Process: No Impairment


Psychotic Thoughts and Behaviors: 





denied





- Suicidal Ideation


Suicidal Ideation: No


Plan: 





denied





- Homicidal Ideation


Homicidal Ideation: No


Plan: 





denied





Goal/Treatment Plan





- Goal/Treatment Plan


Need for Continued Stay: Discharge may exacerbated symptoms, Severe functional 

impairment


Progress Toward Problem(s) and Goals/Treatment Plan: 





Continue current management and medications.


Patient educated about risks, benefits, side effects & alternatives of meds. Pt 

verbalized understanding & agreed with the above.


Therapy in milieu.


Estimated Date of D/C: 06/11/18

## 2018-06-10 RX ADMIN — AMOXICILLIN AND CLAVULANATE POTASSIUM SCH TAB: 500; 125 TABLET, FILM COATED ORAL at 19:00

## 2018-06-10 RX ADMIN — AMOXICILLIN AND CLAVULANATE POTASSIUM SCH TAB: 500; 125 TABLET, FILM COATED ORAL at 06:38

## 2018-06-10 NOTE — PCM.PYCHPN
Psychiatric Progress Note





- Psychiatric Progress Note


Patient seen today, length of contact: 16 min


Patient Chief Complaint: 





"I'm feeling better"


Problems Identified/Issues Discussed: 





The pt is seen, chart reviewed, case discussed with staff. The pt reported that 

in the morning she has had withdrawal symptoms but which subsided after taking 

medication. 


The pt is compliant with medications and reports no side-effects. Symptoms are 

improving but needs more time to stabilize.


After care discussed, support and psychoeducation given.


DSM 5 Symptoms Update: 





Opioid dependence, severe, withdrawal symptoms. 


Medication Change: Yes (detox changes daily)


Medical Record Reviewed: Yes





Mental Status Examination





- Cognitive Function


Orientation: Person, Place, Situation, Time


Memory: Intact


Attention: WNL


Concentration: WNL


Association: WN


Fund of Knowledge: [facility]


Decription of patient's judgement and insights: 





fair/good





- Mood


Mood: Anxious





- Affect


Affect: Constricted





- Speech


Speech: Appropriate





- Formal Thought Process


Formal Thought Process: No Impairment


Psychotic Thoughts and Behaviors: 





denied 





- Suicidal Ideation


Suicidal Ideation: No


Plan: 





denied





- Homicidal Ideation


Homicidal Ideation: No


Plan: 





denied





Goal/Treatment Plan





- Goal/Treatment Plan


Need for Continued Stay: Discharge may exacerbated symptoms, Severe functional 

impairment


Progress Toward Problem(s) and Goals/Treatment Plan: 





Continue current management and medications.


Patient educated about risks, benefits, side effects & alternatives of meds. Pt 

verbalized understanding & agreed with the above.


Therapy in milieu. 


Estimated Date of D/C: 06/11/18





- Smoking Cessation


Smoking Cessation Initiated: Yes

## 2018-06-11 VITALS
TEMPERATURE: 98.2 F | DIASTOLIC BLOOD PRESSURE: 74 MMHG | RESPIRATION RATE: 18 BRPM | HEART RATE: 86 BPM | SYSTOLIC BLOOD PRESSURE: 103 MMHG

## 2018-06-11 VITALS — OXYGEN SATURATION: 98 %

## 2018-06-11 NOTE — PCM.PYCHDC
Mental Status Examination





- Mental Status Examination


Orientation: Person, Place, Situation, Time


Memory: Intact


Mood: Anxious


Affect: Constricted


Speech: Appropriate


Attention: WNL


Concentration: WNL


Association: WNL


Fund of Knowledge: WNL


Formal Thought Process: No Impairment


Suicidal Ideation: No


Current Homicidal Ideation?: No





Discharge Summary





- Discharge Note


Reason for Hospitalization: 





opioid detox


Consultations:: List each consultation separately and include:  1. Reason for 

request.  2. Findings.  3. Follow-up


Summary of Hospital Course include:: 1. Description of specific treatment plan 

utilized for patients during their course of treatmen.  2. Summarize the time-

course for resolution of acute symptoms and/or regressed behaviors.  3. 

Describe issues identified and worked on during hospitalization.  4. Describe 

medication utilized.  5. Describe medical problems identified and treated.  6. 

Reassessment of suicide risk


Summary of Hospital Course: 





The patient is seen, chart reviewed and case discussed.





On admission:


This is a 43-year-old  female, homeless and unemployed, she is  

but  for 5 years, however she was living with her ex- up until 

last weekend when he left for [facility].


The patient is also from [facility] and came here 15 years ago. She doesn't have a 

child.





The patient is here for heroin detox; using 20 bags intranasally for the past 

10 years. She had one detox in the past but never been to rehabilitation or  

or used MAT.


She is also using crack cocaine the last 2 or 3 weeks but denies alcohol, 

cannabis and all other drugs. She is positive for benzos but she denies using, 

however she was given Xanax for anxiety but she says she stopped a month ago.





She says she couldn't stop on her own and kept on relapsing.





Past psych history: Depression and anxiety. No admissions and no suicide 

attempts





Medical history: Denies. She takes insulin for tooth abscess





Family psych history: Uncle had substance use and committed suicide when 

patient was a child





Hospital course:


The pt was admitted and started on treatment with psychotherapy, support, 

psychoeducation and medications. 


MI and CBT used.


The pt attended groups and activities, as well as milieu therapy.


All the risks and benefits of medications are discussed and the patient 

understood and agreed.


The pt improved with the treatments provided. 


After care discussed with the patient. She will go to [facility] in [place].





- Final Diagnosis (DSM 5)


Condition upon Discharge: GOOD


DSM 5: 





Opioid withdrawal


Opioid use disorder, severe


Cocaine use disorder, severe


Major depressive disorder, moderate


Generalized anxiety disorder





Disposition: REHAB FACILITY/REHAB UNIT


Follow-up Treatment Plan: 





Continue below medications after discharge.


Follow after care plan as discussed.


Use relapse prevention skills


Return to ER or call 911 if suicidal, homicidal or symptoms relapse.


Stay away from stress, alcohol and drugs.


See primary doctor regularly and get labs.    





Prescriptions/Medication Reconciliation: 


Escitalopram [Lexapro] 10 mg PO DAILY #30 tab


QUEtiapine [Seroquel] 100 mg PO HS #30 tab





- Smoking Cessation


Smoking Cessation Medication prescribed: No





- Antipsychotic Medications


Pt discharged on 2 or more routine antipsychotic medications: No

## 2018-07-02 ENCOUNTER — HOSPITAL ENCOUNTER (EMERGENCY)
Dept: HOSPITAL 14 - H.ER | Age: 43
LOS: 1 days | Discharge: HOME | End: 2018-07-03
Payer: MEDICAID

## 2018-07-02 VITALS — RESPIRATION RATE: 18 BRPM

## 2018-07-02 VITALS — BODY MASS INDEX: 17.5 KG/M2

## 2018-07-02 DIAGNOSIS — F11.20: Primary | ICD-10-CM

## 2018-07-02 LAB
ALBUMIN SERPL-MCNC: 4 G/DL (ref 3.5–5)
ALBUMIN/GLOB SERPL: 1.1 {RATIO} (ref 1–2.1)
ALT SERPL-CCNC: 33 U/L (ref 9–52)
AST SERPL-CCNC: 25 U/L (ref 14–36)
BASOPHILS # BLD AUTO: 0.1 K/UL (ref 0–0.2)
BASOPHILS NFR BLD: 0.8 % (ref 0–2)
BUN SERPL-MCNC: 12 MG/DL (ref 7–17)
CALCIUM SERPL-MCNC: 8.7 MG/DL (ref 8.4–10.2)
EOSINOPHIL # BLD AUTO: 0.1 K/UL (ref 0–0.7)
EOSINOPHIL NFR BLD: 1.3 % (ref 0–4)
ERYTHROCYTE [DISTWIDTH] IN BLOOD BY AUTOMATED COUNT: 13.4 % (ref 11.5–14.5)
GFR NON-AFRICAN AMERICAN: > 60
HGB BLD-MCNC: 13.5 G/DL (ref 12–16)
LYMPHOCYTES # BLD AUTO: 2.4 K/UL (ref 1–4.3)
LYMPHOCYTES NFR BLD AUTO: 36.4 % (ref 20–40)
MCH RBC QN AUTO: 31.1 PG (ref 27–31)
MCHC RBC AUTO-ENTMCNC: 33.5 G/DL (ref 33–37)
MCV RBC AUTO: 92.6 FL (ref 81–99)
MONOCYTES # BLD: 0.5 K/UL (ref 0–0.8)
MONOCYTES NFR BLD: 7.8 % (ref 0–10)
NEUTROPHILS # BLD: 3.5 K/UL (ref 1.8–7)
NEUTROPHILS NFR BLD AUTO: 53.7 % (ref 50–75)
NRBC BLD AUTO-RTO: 0 % (ref 0–0)
PLATELET # BLD: 161 K/UL (ref 130–400)
PMV BLD AUTO: 9.7 FL (ref 7.2–11.7)
RBC # BLD AUTO: 4.34 MIL/UL (ref 3.8–5.2)
T3: 0.83 NMOL/L (ref 1.49–2.6)
WBC # BLD AUTO: 6.6 K/UL (ref 4.8–10.8)

## 2018-07-02 PROCEDURE — 83735 ASSAY OF MAGNESIUM: CPT

## 2018-07-02 PROCEDURE — 84100 ASSAY OF PHOSPHORUS: CPT

## 2018-07-02 PROCEDURE — 80053 COMPREHEN METABOLIC PANEL: CPT

## 2018-07-02 PROCEDURE — 80361 OPIATES 1 OR MORE: CPT

## 2018-07-02 PROCEDURE — 84443 ASSAY THYROID STIM HORMONE: CPT

## 2018-07-02 PROCEDURE — 80349 CANNABINOIDS NATURAL: CPT

## 2018-07-02 PROCEDURE — 93005 ELECTROCARDIOGRAM TRACING: CPT

## 2018-07-02 PROCEDURE — 86870 RBC ANTIBODY IDENTIFICATION: CPT

## 2018-07-02 PROCEDURE — 80320 DRUG SCREEN QUANTALCOHOLS: CPT

## 2018-07-02 PROCEDURE — 80358 DRUG SCREENING METHADONE: CPT

## 2018-07-02 PROCEDURE — 86308 HETEROPHILE ANTIBODY SCREEN: CPT

## 2018-07-02 PROCEDURE — 99285 EMERGENCY DEPT VISIT HI MDM: CPT

## 2018-07-02 PROCEDURE — 80324 DRUG SCREEN AMPHETAMINES 1/2: CPT

## 2018-07-02 PROCEDURE — 85025 COMPLETE CBC W/AUTO DIFF WBC: CPT

## 2018-07-02 PROCEDURE — 86900 BLOOD TYPING SEROLOGIC ABO: CPT

## 2018-07-02 PROCEDURE — 86850 RBC ANTIBODY SCREEN: CPT

## 2018-07-02 PROCEDURE — 83992 ASSAY FOR PHENCYCLIDINE: CPT

## 2018-07-02 PROCEDURE — 84439 ASSAY OF FREE THYROXINE: CPT

## 2018-07-02 PROCEDURE — 80346 BENZODIAZEPINES1-12: CPT

## 2018-07-02 PROCEDURE — 80353 DRUG SCREENING COCAINE: CPT

## 2018-07-02 PROCEDURE — 80345 DRUG SCREENING BARBITURATES: CPT

## 2018-07-02 PROCEDURE — 82948 REAGENT STRIP/BLOOD GLUCOSE: CPT

## 2018-07-02 PROCEDURE — 84480 ASSAY TRIIODOTHYRONINE (T3): CPT

## 2018-07-02 NOTE — ED PDOC
HPI: General Adult


Time Seen by Provider: 07/02/18 21:30


Chief Complaint (Nursing): Weakness/Neurological Deficit


Chief Complaint (Provider): Weakness


History Per: Patient


History/Exam Limitations: no limitations


Current Symptoms Are (Timing): Still Present


Additional Complaint(s): 





43 year old female presents to the ED complaining of weakness throughout the 

body ongoing for a week.  Patient reports she tries to get up but feels like 

she will fall down.  She experiences multiple episodes of anxiety, panic attack

, decreased appetite, poor PO intake, and excessive urination.  Patient denies 

dysuria, hematuria, or fever but reports chills.  About a month ago, she was in 

detox for heroine use in [facility] and discharged on lexapro and seroquel 

but ran out and prescriptions were not refilled.  Patient was sent to the sober 

house from detox but felt so sick there and withdrawal that she stayed 3 days 

and got suboxone to help with withdrawal.  Patient is no longer taking suboxone 

and denies heroine use since discharge.  





PMD: none





Past Medical History


Reviewed: Historical Data, Nursing Documentation, Vital Signs


Vital Signs: 


 Last Vital Signs











Temp  98.6 F   07/03/18 01:34


 


Pulse  78   07/03/18 01:34


 


Resp  18   07/03/18 01:34


 


BP  116/72   07/03/18 01:34


 


Pulse Ox  99   07/03/18 04:40














- Medical History


PMH: Anxiety, Depression


   Denies: Diabetes, Hepatitis, HIV, HTN, End Stage Renal Disease, Chronic 

Kidney Disease, Seizures, Sexually Transmitted Disease





- Surgical History


Surgical History: No Surg Hx





- Family History


Family History: States: Diabetes, Other


Other Family History: Cancer





- Social History


Current smoker - smoking cessation education provided: Yes


Alcohol: None





- Immunization History


Hx Tetanus Toxoid Vaccination: No


Hx Influenza Vaccination: No


Hx Pneumococcal Vaccination: No





- Home Medications


Home Medications: 


 Ambulatory Orders











 Medication  Instructions  Recorded


 


Ibuprofen [Motrin] 600 mg PO Q6 06/05/18


 


Penicillin VK [Penicillin VK Tab] 500 mg PO TID 06/05/18


 


Escitalopram [Lexapro] 10 mg PO DAILY #30 tab 06/11/18


 


QUEtiapine [Seroquel] 100 mg PO HS #30 tab 06/11/18














- Allergies


Allergies/Adverse Reactions: 


 Allergies











Allergy/AdvReac Type Severity Reaction Status Date / Time


 


No Known Allergies Allergy   Verified 07/03/18 05:12














Review of Systems


ROS Statement: Except As Marked, All Systems Reviewed And Found Negative (as 

per HPI)


Constitutional: Positive for: Chills, Weakness.  Negative for: Fever


Genitourinary Female: Positive for: Other (excessive urination).  Negative for: 

Dysuria, Hematuria





Physical Exam





- Reviewed


Nursing Documentation Reviewed: Yes


Vital Signs Reviewed: Yes





- Physical Exam


Appears: Positive for: Non-toxic, No Acute Distress (tired appearing)


Skin: Positive for: Warm, Dry, Pallor


Eye Exam: Positive for: EOMI, PERRL, Other (sunken orbitals)


ENT: Positive for: Pharynx Is (clear).  Negative for: Pharyngeal Erythema, 

Tonsillar Exudate


Neck: Positive for: Painless ROM, Supple


Cardiovascular/Chest: Positive for: Regular Rate, Rhythm.  Negative for: Murmur


Respiratory: Positive for: Normal Breath Sounds.  Negative for: Respiratory 

Distress


Gastrointestinal/Abdominal: Positive for: Soft.  Negative for: Tenderness


Back: Positive for: Normal Inspection


Extremity: Positive for: Normal ROM.  Negative for: Pedal Edema, Deformity


Lymphatic: Negative for: Adenopathy


Neurologic/Psych: Positive for: Alert, Oriented (x3), Mood/Affect (anxious mood 

but flat affect).  Negative for: Motor/Sensory Deficits





- Laboratory Results


Result Diagrams: 


 07/02/18 22:19





 07/02/18 22:19





- ECG


ECG Rhythm: Positive for: Normal QRS, Normal ST Segment, Sinus Rhythm


Rate: 70


O2 Sat by Pulse Oximetry: 99 (RA)


Pulse Ox Interpretation: Normal





Medical Decision Making


Medical Decision Making: 





Initial Impression: weakness. Differentials include but not limited to 

dehydration, electrolyte abnormality, thyroid syndrome, viral syndrome, 

depression, anxiety





Initial Plan: 


Type and screen


ECG


Alcohol serum


CMP


Drug screen


Free T4


Magnesium


Phosphorous


T3


Thyroid stimulating hormone


ED urine pregnancy


ED urine dipstick


CBC


Glucose


Sodium chloride 1000mL IV


Infection mononucleosis


 


Labs wnl


Crisis to evaluate for anxiety and depression





--------------------------------------------------------------------------------

-----------------


Scribe Attestation:


Documented by David Varela acting as a scribe for Kylie Aguilar MD.





Provider Scribe Attestation:


All medical record entries made by the Scribe were at my direction and 

personally dictated by me. I have reviewed the chart and agree that the record 

accurately reflects my personal performance of the history, physical exam, 

medical decision making, and the department course for this patient. I have 

also personally directed, reviewed, and agree with the discharge instructions 

and disposition.





Disposition





- Clinical Impression


Clinical Impression: 


 Opioid use disorder, severe, dependence








- Disposition


Disposition: Transfer of Care


Disposition Time: 00:00


Condition: STABLE


Instructions:  Opioid Use Disorder


Forms:  [employer] (English)


Patient Signed Over To: Saravanan Bermudez


Handoff Comments: Pending crisis evaluation, reassessment and final ER 

disposition

## 2018-07-03 ENCOUNTER — HOSPITAL ENCOUNTER (EMERGENCY)
Dept: HOSPITAL 31 - C.ER | Age: 43
Discharge: HOME | End: 2018-07-03
Payer: MEDICAID

## 2018-07-03 VITALS — SYSTOLIC BLOOD PRESSURE: 116 MMHG | TEMPERATURE: 98.6 F | DIASTOLIC BLOOD PRESSURE: 72 MMHG

## 2018-07-03 VITALS — TEMPERATURE: 97.4 F

## 2018-07-03 VITALS — OXYGEN SATURATION: 98 % | SYSTOLIC BLOOD PRESSURE: 114 MMHG | DIASTOLIC BLOOD PRESSURE: 71 MMHG | HEART RATE: 93 BPM

## 2018-07-03 VITALS — RESPIRATION RATE: 18 BRPM

## 2018-07-03 VITALS — BODY MASS INDEX: 17.5 KG/M2 | OXYGEN SATURATION: 99 %

## 2018-07-03 VITALS — HEART RATE: 70 BPM

## 2018-07-03 DIAGNOSIS — F11.90: Primary | ICD-10-CM

## 2018-07-03 LAB
ALBUMIN SERPL-MCNC: 4 [, G/DL] (ref 3.5–5)
ALBUMIN/GLOB SERPL: 1.3 [,] (ref 1–2.1)
ALT SERPL-CCNC: 34 [, U/L] (ref 9–52)
AST SERPL-CCNC: 38 [, U/L] (ref 14–36)
BACTERIA #/AREA URNS HPF: (no result) [,]
BASOPHILS # BLD AUTO: 0.1 [, K/UL] (ref 0–0.2)
BASOPHILS NFR BLD: 0.8 [, %] (ref 0–2)
BILIRUB UR-MCNC: NEGATIVE [,]
BUN SERPL-MCNC: 11 [, MG/DL] (ref 7–17)
CALCIUM SERPL-MCNC: 8.8 [, MG/DL] (ref 8.6–10.4)
EOSINOPHIL # BLD AUTO: 0.2 [, K/UL] (ref 0–0.7)
EOSINOPHIL NFR BLD: 2.1 [, %] (ref 0–4)
ERYTHROCYTE [DISTWIDTH] IN BLOOD BY AUTOMATED COUNT: 13 [, %] (ref 11.5–14.5)
GFR NON-AFRICAN AMERICAN: > 60 [,]
GLUCOSE UR STRIP-MCNC: NORMAL [, MG/DL]
HGB BLD-MCNC: 12.4 [, G/DL] (ref 11–16)
LEUKOCYTE ESTERASE UR-ACNC: (no result) [, LEU/UL]
LYMPHOCYTES # BLD AUTO: 2.8 [, K/UL] (ref 1–4.3)
LYMPHOCYTES NFR BLD AUTO: 38.9 [, %] (ref 20–40)
MCH RBC QN AUTO: 31.8 [, PG] (ref 27–31)
MCHC RBC AUTO-ENTMCNC: 34.8 [, G/DL] (ref 33–37)
MCV RBC AUTO: 91.6 [, FL] (ref 81–99)
MONOCYTES # BLD: 0.5 [, K/UL] (ref 0–0.8)
MONOCYTES NFR BLD: 7 [, %] (ref 0–10)
NEUTROPHILS # BLD: 3.6 [, K/UL] (ref 1.8–7)
NEUTROPHILS NFR BLD AUTO: 51.2 [, %] (ref 50–75)
NRBC BLD AUTO-RTO: 0 [, %] (ref 0–2)
PH UR STRIP: 6 [,] (ref 5–8)
PLATELET # BLD: 149 [, K/UL] (ref 130–400)
PMV BLD AUTO: 9.5 [, FL] (ref 7.2–11.7)
PROT UR STRIP-MCNC: NEGATIVE [, MG/DL]
RBC # BLD AUTO: 3.89 [, MIL/UL] (ref 3.8–5.2)
RBC # UR STRIP: NEGATIVE [,]
SP GR UR STRIP: 1 [,] (ref 1–1.03)
SQUAMOUS EPITHIAL: 1 [, /HPF] (ref 0–5)
UROBILINOGEN UR-MCNC: NORMAL [, MG/DL] (ref 0.2–1)
WBC # BLD AUTO: 7.1 [, K/UL] (ref 4.8–10.8)

## 2018-07-03 NOTE — CARD
--------------- APPROVED REPORT --------------





EKG Measurement

Heart [id number]

IN [id number]

[id number]

[id number]

[id number]



<Conclusion>

Normal sinus rhythm

Normal ECG

## 2018-07-03 NOTE — ED PDOC
- Laboratory Results


Result Diagrams: 


 18 22:19





 18 22:19





- ECG


O2 Sat by Pulse Oximetry: 99 (RA)





Medical Decision Making


Medical Decision Makin:00


Patient endorsed to me by Dr. Aguilar pending crisis and reevaluation.





1:13


Patient seen and evaluated by crisis. Patient stable for discharge. 





--------------------------------------------------------------------------------

-----


Scribe Attestation:


Documented by Bryon Cohen, acting as a scribe for Saravanan Bermudez MD.





Provider Scribe Attestation:


All medical record entries made by the Scribe were at my direction and 

personally dictated by me. I have reviewed the chart and agree that the record 

accurately reflects my personal performance of the history, physical exam, 

medical decision making, and the department course for this patient. I have 

also personally directed, reviewed, and agree with the discharge instructions 

and disposition.








Disposition


Counseled Patient/Family Regarding: Studies Performed, Diagnosis, Need For 

Followup





- Clinical Impression


Clinical Impression: 


 Opioid use disorder, severe, dependence








- POA


Present On Arrival: None





- Disposition


Disposition: Routine/Home


Disposition Time: 01:13


Condition: STABLE


Instructions:  Opioid Use Disorder


Forms:  [employer] (English)

## 2018-07-03 NOTE — C.PDOC
History Of Present Illness





44 y/o female presents to ED for complaints of dizziness and not feeling well. 

Patient states she feels like she is having a withdrawal. Patient was seen 

earlier in [place] and discharged. Patient also reports drug use in the past. 

Denies any other physical complaints. 


Chief Complaint (Nursing): Medical Clearance


History Per: Patient


History/Exam Limitations: no limitations


Onset/Duration Of Symptoms: Hrs


Current Symptoms Are (Timing): Still Present


Recent travel outside of the [place]: No





Past Medical History


Reviewed: Historical Data, Nursing Documentation, Vital Signs


Vital Signs: 


 Last Vital Signs











Temp  97.4 F L  07/03/18 05:07


 


Pulse  93 H  07/03/18 05:27


 


Resp  18   07/03/18 06:00


 


BP  114/71   07/03/18 05:27


 


Pulse Ox  98   07/03/18 05:58














- Medical History


PMH: Anxiety, Depression





- [facility] Procedures








DETOXIFICATION SERVICES FOR SUBSTANCE ABUSE TREATMENT (06/05/18)


GROUP  FOR SUBSTANCE ABUSE TREATMENT, PSYCHOEDUCATION (06/05/18)


GROUP  FOR SUBSTANCE ABUSE, COGNITIVE BEHAVIORAL (06/05/18)


GROUP PSYCHOTHERAPY (06/05/18)


INDIV PSYCHOTHERAPY FOR SUBSTANCE ABUSE TREATMENT, SUPPORT (06/05/18)


INDIV PSYCHOTHERAPY FOR SUBSTANCE ABUSE, COGNITIV BEHAVIORAL (06/05/18)


INDIV PSYCHOTHERAPY FOR SUBSTANCE ABUSE, PSYCHOEDUCATION (06/05/18)


INDIVIDUAL PSYCHOTHERAPY, COGNITIVE-BEHAVIORAL (06/05/18)


INDIVIDUAL PSYCHOTHERAPY, SUPPORTIVE (06/05/18)


INJECT/INFUSE NEC (09/09/14)








Family History: States: Diabetes





- Social History


Hx Alcohol Use: No


Hx Substance Use: Yes





- Immunization History


Hx Tetanus Toxoid Vaccination: No


Hx Influenza Vaccination: No


Hx Pneumococcal Vaccination: No





Review Of Systems


Constitutional: Positive for: Other (Not feeling well).  Negative for: Fever, 

Chills


Cardiovascular: Negative for: Chest Pain


Gastrointestinal: Negative for: Nausea, Vomiting, Abdominal Pain, Diarrhea


Skin: Negative for: Rash


Neurological: Positive for: Dizziness.  Negative for: Weakness, Numbness





Physical Exam





- Physical Exam


Appears: Well, Non-toxic, No Acute Distress


Skin: Normal Color, Warm, Dry


Head: Atraumatic, Normacephalic


Eye(s): bilateral: Normal Inspection, PERRL, EOMI


Nose: Normal, No Discharge


Oral Mucosa: Moist


Neck: Supple


Chest: Symmetrical, No Tenderness


Cardiovascular: Rhythm Regular, No Murmur


Respiratory: Normal Breath Sounds, No Decreased Breath Sounds, No Rales, No 

Rhonchi, No Wheezing


Gastrointestinal/Abdominal: Soft, No Tenderness


Extremity: Normal ROM, No Deformity


Extremity: Bilateral: Atraumatic, Normal Color And Temperature, Normal ROM


Neurological/Psych: Oriented x3 (Awake and alert), Normal Speech (Speaking in 

full sentences ), Other (No focal deficits )


Gait: Steady





ED Course And Treatment





- Laboratory Results


Result Diagrams: 


 07/03/18 05:57





 07/03/18 05:57


O2 Sat by Pulse Oximetry: 98 (RA)


Pulse Ox Interpretation: Normal





Medical Decision Making


Medical Decision Making: 





Administered IV fluids. 


Ordered blood work and urinalysis. 





Patient was told there are no detox beds available and she got upset and 

eloped. 





Disposition


Counseled Patient/Family Regarding: Diagnosis





- Disposition


Referrals: 


[facility] [Outside]


Disposition: HOME/ ROUTINE


Disposition Time: 05:54


Condition: STABLE


Instructions:  Opioid Use Disorder


Forms:  [employer] [facility] (English)





- POA


Present On Arrival: None





- Clinical Impression


Clinical Impression: 


 Opioid use disorder








- Scribe Statement


The provider has reviewed the documentation as recorded by the Scribe





Les Nuñez





All medical record entries made by the Scribe were at my direction and 

personally dictated by me. I have reviewed the chart and agree that the record 

accurately reflects my personal performance of the history, physical exam, 

medical decision making, and the department course for this patient. I have 

also personally directed, reviewed, and agree with the discharge instructions 

and disposition.

## 2018-07-06 ENCOUNTER — HOSPITAL ENCOUNTER (EMERGENCY)
Dept: HOSPITAL 31 - C.ER | Age: 43
Discharge: HOME | End: 2018-07-06
Payer: MEDICAID

## 2018-07-06 VITALS — RESPIRATION RATE: 16 BRPM

## 2018-07-06 VITALS
DIASTOLIC BLOOD PRESSURE: 74 MMHG | OXYGEN SATURATION: 100 % | TEMPERATURE: 98.7 F | HEART RATE: 55 BPM | SYSTOLIC BLOOD PRESSURE: 118 MMHG

## 2018-07-06 VITALS — BODY MASS INDEX: 17.5 KG/M2

## 2018-07-06 DIAGNOSIS — H60.92: Primary | ICD-10-CM

## 2018-07-06 NOTE — C.PDOC
History Of Present Illness


The patient reports pain to the left ear over the past 3 days. The patient 

reports that the right ear has started having pain as well today. Denies fever, 

trauma, hearing loss. 


Time Seen by Provider: 07/06/18 21:25


Chief Complaint (Nursing): ENT Problem


History Per: Patient


History/Exam Limitations: None


Onset/Duration Of Symptoms: Persistent


Current Symptoms Are (Timing): Still Present


Quality (Ear): Pain W/Touch


Symptoms Have Been: Continuous


Pain Scale Rating Of: 6


Anticoagulant/Antiplatlet Use?: No





Past Medical History


Reviewed: Historical Data, Nursing Documentation, Vital Signs


Vital Signs: 


 Last Vital Signs











Temp  98 F   07/06/18 21:05


 


Pulse  70   07/06/18 21:05


 


Resp  16   07/06/18 21:05


 


BP  99/68 L  07/06/18 21:05


 


Pulse Ox  99   07/06/18 22:22














- Medical History


PMH: Anxiety, Depression


   Denies: HIV, HTN, End Stage Renal Disease, Chronic Kidney Disease, Seizures, 

Sexually Transmitted Disease





- [facility] Procedures








DETOXIFICATION SERVICES FOR SUBSTANCE ABUSE TREATMENT (06/05/18)


GROUP  FOR SUBSTANCE ABUSE TREATMENT, PSYCHOEDUCATION (06/05/18)


GROUP  FOR SUBSTANCE ABUSE, COGNITIVE BEHAVIORAL (06/05/18)


GROUP PSYCHOTHERAPY (06/05/18)


INDIV PSYCHOTHERAPY FOR SUBSTANCE ABUSE TREATMENT, SUPPORT (06/05/18)


INDIV PSYCHOTHERAPY FOR SUBSTANCE ABUSE, COGNITIV BEHAVIORAL (06/05/18)


INDIV PSYCHOTHERAPY FOR SUBSTANCE ABUSE, PSYCHOEDUCATION (06/05/18)


INDIVIDUAL PSYCHOTHERAPY, COGNITIVE-BEHAVIORAL (06/05/18)


INDIVIDUAL PSYCHOTHERAPY, SUPPORTIVE (06/05/18)


INJECT/INFUSE NEC (09/09/14)








Family History: States: Diabetes





- Social History


Hx Alcohol Use: No


Hx Substance Use: Yes





- Immunization History


Hx Tetanus Toxoid Vaccination: No


Hx Influenza Vaccination: No


Hx Pneumococcal Vaccination: No





Review Of Systems


Constitutional: Negative for: Fever, Chills


Eyes: Negative for: Pain


ENT: Positive for: Ear Pain


Cardiovascular: Negative for: Chest Pain


Respiratory: Negative for: Cough, Shortness of Breath


Gastrointestinal: Negative for: Vomiting


Neurological: Negative for: Weakness, Numbness





Physical Exam





- Physical Exam


Appears: Non-toxic, No Acute Distress


Skin: Normal Color, Warm, No Rash


Head: Atraumatic, Normacephalic


Eye(s): bilateral: Normal Inspection, PERRL, EOMI


Ear(s): Left: TM Erythema, Other ((+) swelling to the external canal with 

tenderness on pushing of the tragus and pinna. ), Right: Normal (Mild cerumen )


Oral Mucosa: Moist


Throat: No Erythema, No Exudate


Neck: Normal ROM, Supple


Lymphatic: Normal Exam


Chest: Symmetrical, No Tenderness


Respiratory: Normal Breath Sounds, No Accessory Muscle Use


Extremity: Normal ROM, No Swelling


Neurological/Psych: Oriented x3, Normal Speech, Normal Cranial Nerves


Gait: Steady





ED Course And Treatment


O2 Sat by Pulse Oximetry: 99 (on RA)


Pulse Ox Interpretation: Normal





Disposition





- Disposition


Referrals: 


Behin,Babak, MD [Staff Provider] - 


Disposition: HOME/ ROUTINE


Disposition Time: 22:09


Condition: STABLE


Additional Instructions: 


Follow up with the medical doctor within 1-2 days. Return if worsened, 


Prescriptions: 


Amoxicillin/Clavulanate [Augmentin 875 MG-125 MG] 1 tab PO BID #14 tab


Ibuprofen [Motrin] 600 mg PO TID #21 tab


Neomycin/Polymyxin/Hydrocortis [Cortisporin Otic Susp] 3 drop AU TID #1 bottle


Instructions:  Outer Ear Infection (DC)


Forms:  [facility] (English)





- Clinical Impression


Clinical Impression: 


 Otitis externa

## 2018-07-08 ENCOUNTER — HOSPITAL ENCOUNTER (INPATIENT)
Dept: HOSPITAL 31 - C.ER | Age: 43
LOS: 4 days | Discharge: HOME | DRG: 426 | End: 2018-07-12
Attending: PSYCHIATRY & NEUROLOGY | Admitting: PSYCHIATRY & NEUROLOGY
Payer: MEDICAID

## 2018-07-08 VITALS — BODY MASS INDEX: 17.5 KG/M2

## 2018-07-08 DIAGNOSIS — F14.10: ICD-10-CM

## 2018-07-08 DIAGNOSIS — F11.23: ICD-10-CM

## 2018-07-08 DIAGNOSIS — Z59.0: ICD-10-CM

## 2018-07-08 DIAGNOSIS — Z79.4: ICD-10-CM

## 2018-07-08 DIAGNOSIS — F41.1: ICD-10-CM

## 2018-07-08 DIAGNOSIS — E11.9: ICD-10-CM

## 2018-07-08 DIAGNOSIS — F32.9: Primary | ICD-10-CM

## 2018-07-08 LAB
ALBUMIN SERPL-MCNC: 4.1 G/DL (ref 3.5–5)
ALBUMIN/GLOB SERPL: 1.4 {RATIO} (ref 1–2.1)
ALT SERPL-CCNC: 17 U/L (ref 9–52)
APAP SERPL-MCNC: < 10 UG/ML (ref 10–30)
AST SERPL-CCNC: 17 U/L (ref 14–36)
BACTERIA #/AREA URNS HPF: (no result) /[HPF]
BASOPHILS # BLD AUTO: 0.1 K/UL (ref 0–0.2)
BASOPHILS NFR BLD: 1 % (ref 0–2)
BILIRUB UR-MCNC: NEGATIVE MG/DL
BUN SERPL-MCNC: 11 MG/DL (ref 7–17)
CALCIUM SERPL-MCNC: 8 MG/DL (ref 8.6–10.4)
EOSINOPHIL # BLD AUTO: 0.1 K/UL (ref 0–0.7)
EOSINOPHIL NFR BLD: 2.5 % (ref 0–4)
ERYTHROCYTE [DISTWIDTH] IN BLOOD BY AUTOMATED COUNT: 13.3 % (ref 11.5–14.5)
GFR NON-AFRICAN AMERICAN: > 60
GLUCOSE UR STRIP-MCNC: NORMAL MG/DL
HCG,QUALITATIVE URINE: NEGATIVE
HGB BLD-MCNC: 12.9 G/DL (ref 11–16)
LEUKOCYTE ESTERASE UR-ACNC: (no result) LEU/UL
LYMPHOCYTES # BLD AUTO: 2.5 K/UL (ref 1–4.3)
LYMPHOCYTES NFR BLD AUTO: 41.9 % (ref 20–40)
MCH RBC QN AUTO: 31.5 PG (ref 27–31)
MCHC RBC AUTO-ENTMCNC: 34.6 G/DL (ref 33–37)
MCV RBC AUTO: 91.1 FL (ref 81–99)
MONOCYTES # BLD: 0.4 K/UL (ref 0–0.8)
MONOCYTES NFR BLD: 6.8 % (ref 0–10)
NEUTROPHILS # BLD: 2.8 K/UL (ref 1.8–7)
NEUTROPHILS NFR BLD AUTO: 47.8 % (ref 50–75)
NRBC BLD AUTO-RTO: 0 % (ref 0–2)
PH UR STRIP: 7 [PH] (ref 5–8)
PLATELET # BLD: 168 K/UL (ref 130–400)
PMV BLD AUTO: 9.5 FL (ref 7.2–11.7)
PROT UR STRIP-MCNC: NEGATIVE MG/DL
RBC # BLD AUTO: 4.1 MIL/UL (ref 3.8–5.2)
RBC # UR STRIP: NEGATIVE /UL
SALICYLATE: < 1 MG/DL 1
SP GR UR STRIP: 1.01 (ref 1–1.03)
SQUAMOUS EPITHIAL: 9 /HPF (ref 0–5)
UROBILINOGEN UR-MCNC: NORMAL MG/DL (ref 0.2–1)
WBC # BLD AUTO: 5.9 K/UL (ref 4.8–10.8)

## 2018-07-08 SDOH — ECONOMIC STABILITY - HOUSING INSECURITY: HOMELESSNESS: Z59.0

## 2018-07-08 NOTE — C.PDOC
History Of Present Illness


43 year old female is brought to the ED for evaluation. Patient is brought in 

for reported overdose. Patient took 20 pills of her 25 mg seroquel medications 

approximately 1 hour ago. Patient denies HI, hallucinations, other acute 

complaints. 


Time Seen by Provider: 07/08/18 22:29


Chief Complaint (Nursing): Psychiatric Evaluation


History Per: Patient, EMS


History/Exam Limitations: no limitations


Onset/Duration Of Symptoms: Hrs


Current Symptoms Are (Timing): Still Present


Suicide/Self Injury Attempted (Context): Ingestion


Modifying Factor(s): None


Associated Symptoms: Depression, Suicidal Thoughts, Suicidal Plan


Involuntary Hold By: None


Recent travel outside of the [place]: No


Additional History Per: Patient





Past Medical History


Reviewed: Historical Data, Nursing Documentation, Vital Signs


Vital Signs: 


 Last Vital Signs











Temp  98.7 F   07/09/18 19:06


 


Pulse  95 H  07/09/18 19:06


 


Resp  18   07/09/18 19:06


 


BP  105/73   07/09/18 19:06


 


Pulse Ox  98   07/09/18 19:06














- Medical History


PMH: Anxiety, Depression


   Denies: HIV, HTN, End Stage Renal Disease, Chronic Kidney Disease, Seizures, 

Sexually Transmitted Disease


Surgical History: No Surg Hx





- [facility] Procedures








DETOXIFICATION SERVICES FOR SUBSTANCE ABUSE TREATMENT (06/05/18)


GROUP  FOR SUBSTANCE ABUSE TREATMENT, PSYCHOEDUCATION (06/05/18)


GROUP  FOR SUBSTANCE ABUSE, COGNITIVE BEHAVIORAL (06/05/18)


GROUP PSYCHOTHERAPY (06/05/18)


INDIV PSYCHOTHERAPY FOR SUBSTANCE ABUSE TREATMENT, SUPPORT (06/05/18)


INDIV PSYCHOTHERAPY FOR SUBSTANCE ABUSE, COGNITIV BEHAVIORAL (06/05/18)


INDIV PSYCHOTHERAPY FOR SUBSTANCE ABUSE, PSYCHOEDUCATION (06/05/18)


INDIVIDUAL PSYCHOTHERAPY, COGNITIVE-BEHAVIORAL (06/05/18)


INDIVIDUAL PSYCHOTHERAPY, SUPPORTIVE (06/05/18)


INJECT/INFUSE NEC (09/09/14)








Family History: States: Diabetes





- Social History


Hx Alcohol Use: No


Hx Substance Use: Yes





- Immunization History


Hx Tetanus Toxoid Vaccination: No


Hx Influenza Vaccination: No


Hx Pneumococcal Vaccination: No





Review Of Systems


Except As Marked, All Systems Reviewed And Found Negative.


Psych: Positive for: Depression, Suicidal ideation





Physical Exam





- Physical Exam


Appears: Non-toxic, No Acute Distress


Skin: Normal Color, Warm, Dry


Head: Atraumatic, Normacephalic


Eye(s): bilateral: Normal Inspection


Oral Mucosa: Moist


Neck: Normal ROM, Supple


Chest: Symmetrical


Cardiovascular: Rhythm Regular


Respiratory: Normal Breath Sounds, No Rales, No Rhonchi, No Wheezing


Gastrointestinal/Abdominal: Soft, No Tenderness, No Guarding, No Rebound


Extremity: Normal ROM, No Tenderness, No Swelling


Neurological/Psych: Oriented x3, Normal Speech


Gait: Steady





ED Course And Treatment





- Laboratory Results


Result Diagrams: 


 07/09/18 04:12





 07/09/18 04:12


O2 Sat by Pulse Oximetry: 98 (ON RA)


Pulse Ox Interpretation: Normal





Medical Decision Making


Medical Decision Making: 


Impression: overdose


Plan:


* Labs


* CXR


* Ativan 1 mg PO


* UA


* 


rn discussed with posion control. recoommends tele monitoring and serial ekg. 

accepted nany lezama. ekg no qtc prolonation on itial ekg





Disposition





- Disposition


Disposition: HOSPITALIZED


Disposition Time: 12:30


Condition: STABLE





- Clinical Impression


Clinical Impression: 


 Opioid use disorder, severe, dependence, Overdose








- Scribe Statement


The provider has reviewed the documentation as recorded by the Scribe


Wally Stone





All medical record entries made by the Scribe were at my direction and 

personally dictated by me. I have reviewed the chart and agree that the record 

accurately reflects my personal performance of the history, physical exam, 

medical decision making, and the department course for this patient. I have 

also personally directed, reviewed, and agree with the discharge instructions 

and disposition.





Decision To Admit





- Pt Status Changed To:


Hospital Disposition Of: Inpatient





- Admit Certification


Admit to Inpatient:: After my assessment, the patient will require 

hospitalization for at least two midnights.  This is because of the severity of 

symptoms shown, intensity of services needed, and/or the medical risk in this 

patient being treated as an outpatient.





- InPatient:


Physician Admission Certification: I certify that this patient requires 2 or 

more midnights of care for the following reason:: needs cardiac monitoring





- .


Bed Request Type: Telemetry


Admitting Physician: Suzy Lezama


Patient Diagnosis: 


 Opioid use disorder, severe, dependence, Overdose

## 2018-07-09 LAB
ALBUMIN SERPL-MCNC: 3.9 G/DL (ref 3.5–5)
ALBUMIN/GLOB SERPL: 1.3 {RATIO} (ref 1–2.1)
ALT SERPL-CCNC: 27 U/L (ref 9–52)
AST SERPL-CCNC: 20 U/L (ref 14–36)
BUN SERPL-MCNC: 10 MG/DL (ref 7–17)
CALCIUM SERPL-MCNC: 8.5 MG/DL (ref 8.6–10.4)
ERYTHROCYTE [DISTWIDTH] IN BLOOD BY AUTOMATED COUNT: 13.2 % (ref 11.5–14.5)
GFR NON-AFRICAN AMERICAN: > 60
HGB BLD-MCNC: 12.2 G/DL (ref 11–16)
MCH RBC QN AUTO: 31.1 PG (ref 27–31)
MCHC RBC AUTO-ENTMCNC: 33.7 G/DL (ref 33–37)
MCV RBC AUTO: 92.5 FL (ref 81–99)
PLATELET # BLD: 163 K/UL (ref 130–400)
PMV BLD AUTO: 9.1 FL (ref 7.2–11.7)
RBC # BLD AUTO: 3.93 MIL/UL (ref 3.8–5.2)
WBC # BLD AUTO: 6.6 K/UL (ref 4.8–10.8)

## 2018-07-09 RX ADMIN — PANTOPRAZOLE SODIUM SCH MG: 40 TABLET, DELAYED RELEASE ORAL at 11:35

## 2018-07-09 NOTE — CARD
--------------- APPROVED REPORT --------------





EKG Measurement

Heart [id number]

DC 108P62

[id number]

[id number]

[id number]



<Conclusion>

Sinus rhythm with short DC

Possible Left atrial enlargement

Borderline ECG

## 2018-07-09 NOTE — PCM.PSYCH
Initial Psychiatric Evaluation





- Initial Psychiatric Evaluation


Type of Admission: Voluntary


Legal Status: Capacity


Chief Complaint (in patient's own words): 





"I need help so bad"


History of Present Illness and Precipitating Events: 





The patient is seen, chart reviewed and case discussed.


She is known from a recent admission here.





This is a 43-year-old  female, homeless but now staying with a friend 

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 

 for 5 years, however she was living with her ex- up until May 

when he left for [facility].


The patient is also from [facility] and came here 15 years ago. She doesn't have a 

child.





The patient is here again for opioid detox; she was sent to [facility] in [facility] 

but she claims she had ongoing withdrawal sxs and so they discharged her after 4

-5 days and since then she was purchasing subutex from the streets and some 

seroquel, on which she accidentally overdosed the other day, because "I was not 

sleeping for 4 nights." 





She used to use 20 bags intranasally for the past 10 years. She had one detox 

in the past but never been to rehabilitation or  or used MAT until this year


She was/is also using crack cocaine but denies alcohol, cannabis and all other 

drugs. 


She says she couldn't stop on her own and kept on relapsing on subutex and 

sometimes heroin.





Past psych history: Depression and anxiety. No admissions and no suicide 

attempts





Medical history: Denies. 





Family psych history: Uncle had substance use and committed suicide when 

patient was a child


Current Medications: 





Active Medications











Generic Name Dose Route Start Last Admin





  Trade Name Markq  PRN Reason Stop Dose Admin


 


Al Hydrox/Mg Hydrox/Simethicone  30 ml  07/09/18 10:31  





  Maalox 30 Ml  PO   





  TID PRN   





  Indigestion / Heartburn   


 


Hydroxyzine HCl  50 mg  07/09/18 12:27  





  Atarax  PO   





  Q6H PRN   





  Anxiety   


 


Loperamide HCl  2 mg  07/09/18 10:31  





  Imodium  PO   





  Q8 PRN   





  Diarrhea   


 


Ondansetron HCl  4 mg  07/09/18 10:31  





  Zofran Tab  PO   





  Q8 PRN   





  Nausea/Vomiting   


 


Pantoprazole Sodium  40 mg  07/09/18 10:00  07/09/18 11:35





  Protonix Ec Tab  PO   40 mg





  DAILY SAMMY   Administration


 


Quetiapine Fumarate  100 mg  07/09/18 22:00  





  Seroquel  PO   





  HS SAMMY   














Past Psychiatric History





- Past Psychiatric History


Previous Treatment History: None


Pertinent Medical Hx (Current Medical&Sleep Prob, Allergies): 





 Allergies











Allergy/AdvReac Type Severity Reaction Status Date / Time


 


No Known Allergies Allergy   Verified 07/08/18 22:14








 





No Known Home Med  07/08/18 











Review of Systems





- Neurological


Neurological: UNREMARKABLE





- Psychiatric


Psychiatric: Abnormal Sleep Pattern, Anxiety, Difficulty Concentrating.  absent

: Hallucinations, Homicidal Ideation, Hopelessness, Paranoia, Suicidal Ideation





Mental Status Examination





- Personal Presentation


Personal Presentation: Looks stated age





- Affect


Affect: Constricted





- Motor Activity


Motor Activity: Calm





- Reliability in Providing Information


Reliability in Providing Information: Good





- Speech


Speech: Organized





- Mood


Mood: Anxious





- Formal Thought Process


Formal Thought Process: No Impairment





- Cognitive Functions


Orientation: Person, Place, Situation, Time


Sensorium: Alert


Attention/Concentration: Attentive


Estimate of Intelligence: Average


Judgement: Intact, as evidence by: Insight regarding need for hospitalization


Memory: Recent intact, as evidence by: Ability to recall events of the day, 

Remote intact, as evidenced by: Abilit to recall sig. life events





- Risk


Risk: Withdrawal, Diminished functioning





- Strength & Assets Inventory


Strength & Assets Inventory: Cooperative





- Limitations


Limitations: Living alone





DSM 5 DX





- DSM 5


DSM 5 Diagnosis: 





Opioid withdrawal


Opioid use disorder, severe


Cocaine use disorder, severe


Generalized anxiety disorder








- Recommended/Plan of Treatment


Treatment Recommendations and Plan of Treatment: 





Taper with subutex


Gabapentin for augmentation


Ativan if needed


She doesn't want Lexapro for FILOMENA this time


CBT and supportive therapy for FILOMENA and depression


As needed medications


All risks, benefits and alternatives of the meds discussed,


 and the pt agreed and understood. 


Attend groups and activities


Supportive therapy and psychoeducation for substance use


MI for abstinence


CBT for relapse prevention


Encourage MAT


Refer to rehab or IOP, and self-help groups





34 min


Projected ELOS: 4-5 days


Prognosis: good





- Smoking Cessation


Smoking Cessation Initiated: Yes

## 2018-07-09 NOTE — PCM.PSYCH
Initial Psychiatric Evaluation





- Initial Psychiatric Evaluation


Type of Admission: Voluntary


Legal Status: Capacity


Current Medications: 





Active Medications











Generic Name Dose Route Start Last Admin





  Trade Name Freq  PRN Reason Stop Dose Admin


 


Enoxaparin Sodium  40 mg  07/09/18 10:00  





  Lovenox  SC   





  DAILY SAMMY   


 


Pantoprazole Sodium  40 mg  07/09/18 10:00  





  Protonix Ec Tab  PO   





  DAILY SAMMY   














Past Psychiatric History





- Past Psychiatric History


Pertinent Medical Hx (Current Medical&Sleep Prob, Allergies): 





 Allergies











Allergy/AdvReac Type Severity Reaction Status Date / Time


 


No Known Allergies Allergy   Verified 07/08/18 22:14








 





No Known Home Med  07/08/18

## 2018-07-09 NOTE — CARD
--------------- APPROVED REPORT --------------





EKG Measurement

Heart [id number]

SD 116P72

[id number]

[id number]

[id number]



<Conclusion>

Normal sinus rhythm

Normal ECG

## 2018-07-09 NOTE — PCM.BM
<Hannah Fuentes - Last Filed: 07/09/18 13:57>





Treatment Plan Problems





- Problems identified on initial assessmt


  ** Potential for opiate withdrawal


Date Initiated: 07/09/18


Time Initiated: 13:00


Assessment reference: NA


Status: Active





Treatment assets and liabiliti


Patient Assests: adapts well, cooperative, educated, insightful, self-reliant, 

ADL independent, negotiates basic needs


Patient Liabilities: financial problems, poor support system, relationship 

conflicts, substance abuse





- Milieu Protocol


Maintain good personal hygiene: every shift Encourage regular showers, every 

shift Remind patient to perform daily oral care, every shift Assist patient to 

perform ADL's


Maintain personal safety: every shift Educate patient to report safety concerns 

to staff, every shift Monitor environment for contraband/sharps


Medication safety: Monitor for expected outcome, potential side effects: every 

shift, Assess barriers to learning: every shift, Assess readiness for 

medication education: every shift





<Gabriel Denis - Last Filed: 07/09/18 18:59>





- Diagnosis


(1) Opioid use disorder, severe, dependence


Status: Acute   


Interventions: 





07/09/18 18:59


* Assess 7x/week regarding severity of withdrawal


* Educate regarding risks, benefits, side effects and alternatives of 

medications


* Use Motivational Interviewing for abstinence


* Use CBT for relapse prevention


* Medication management for withdrawal symptoms


* Encourage medication assisted treatment


* 








<Gogo Emanuel - Last Filed: 07/10/18 08:37>





Family Contact


Family involvement: No known Family/SO





- Goals for Treatment


Patient goals for treatment: Complete detox and transition to Suboxone 

maintenance.





Discharge/Continuing Care





- Education Needs


Education Needs: Patient Medication, Patient Diagnosis/Disease Process, Patient 

Coping Skills, Patient Anger Management skills, Patient Placement options, 

Patient Community resources





- Discharge


Discharge Criteria: Ability to care for self, No longer exhibiting s/s of 

withdrawal, Reduction of target symptoms


Discharge to:: Home





- Treatment Team Participation


Patient/Family/SO Statement: 





07/10/18 08:36


"I need to be on a maintenance medication..."


Discussed with Family/SO: No


Was Patient/Family/SO present at Treatment Team Meeting: Yes

## 2018-07-10 VITALS — RESPIRATION RATE: 18 BRPM

## 2018-07-10 RX ADMIN — BUPRENORPHINE HCL SCH MG: 2 TABLET SUBLINGUAL at 17:16

## 2018-07-10 RX ADMIN — PANTOPRAZOLE SODIUM SCH MG: 40 TABLET, DELAYED RELEASE ORAL at 09:44

## 2018-07-11 RX ADMIN — PANTOPRAZOLE SODIUM SCH MG: 40 TABLET, DELAYED RELEASE ORAL at 09:08

## 2018-07-11 RX ADMIN — BUPRENORPHINE HCL SCH MG: 2 TABLET SUBLINGUAL at 09:08

## 2018-07-11 NOTE — PCM.PYCHPN
Psychiatric Progress Note





- Psychiatric Progress Note


Patient seen today, length of contact: 16 min


Patient Chief Complaint: 





"I'm a little better"


Problems Identified/Issues Discussed: 





The pt is seen, chart reviewed, case discussed with staff.


Support given, CBT and MI used briefly


No new symptoms reported, improving slowly and needs more time


She reports she has high risk of relapse w/o inpatient detox and good referral


No SEs from medications, risks discussed.


She wants to use as little subutex as possible - risks discussed


After care discussed


Medication Change: Yes (detox changes daily)


Medical Record Reviewed: Yes





Mental Status Examination





- Cognitive Function


Orientation: Person, Place, Situation, Time


Memory: Intact


Attention: WNL


Concentration: Poor


Association: WNL


Fund of Knowledge: WNL





- Mood


Mood: Anxious





- Affect


Affect: Constricted





- Speech


Speech: Appropriate





- Formal Thought Process


Formal Thought Process: No Impairment





- Suicidal Ideation


Suicidal Ideation: No





- Homicidal Ideation


Homicidal Ideation: No





Goal/Treatment Plan





- Goal/Treatment Plan


Need for Continued Stay: Discharge may exacerbated symptoms, Severe functional 

impairment


Progress Toward Problem(s) and Goals/Treatment Plan: 





Taper with subutex


Gabapentin for augmentation


Ativan if needed


She doesn't want Lexapro for FILOMENA this time


CBT and supportive therapy for FILOMENA and depression


As needed medications


All risks, benefits and alternatives of the meds discussed,


 and the pt agreed and understood. 


Attend groups and activities


Supportive therapy and psychoeducation for substance use


MI for abstinence


CBT for relapse prevention


Encourage MAT


Refer to rehab or IOP, and self-help groups

## 2018-07-12 VITALS
DIASTOLIC BLOOD PRESSURE: 83 MMHG | TEMPERATURE: 98.1 F | OXYGEN SATURATION: 99 % | HEART RATE: 76 BPM | SYSTOLIC BLOOD PRESSURE: 117 MMHG

## 2018-07-12 RX ADMIN — PANTOPRAZOLE SODIUM SCH MG: 40 TABLET, DELAYED RELEASE ORAL at 09:11

## 2018-07-13 NOTE — PCM.PYCHPN
Psychiatric Progress Note





- Psychiatric Progress Note


Patient seen today, length of contact: 16 min


Patient Chief Complaint: 





"I'm anxious"


Problems Identified/Issues Discussed: 


The pt is seen, chart reviewed, case discussed with staff.


The pt is compliant with medications and reports no side-effects.


Will take 2 mg today and 1 mg tomorrow - she reports significant wdw sxs 

otherwise


Symptoms are improving but needs more time to stabilize. 


After care discussed, support and psychoeducation given.





Medication Change: Yes (detox changes daily)


Medical Record Reviewed: Yes





Mental Status Examination





- Cognitive Function


Orientation: Person, Place, Situation, Time


Memory: Intact


Attention: WNL


Concentration: Poor


Association: WNL


Fund of Knowledge: WNL





- Mood


Mood: Anxious





- Affect


Affect: Constricted





- Speech


Speech: Appropriate





- Formal Thought Process


Formal Thought Process: No Impairment





- Suicidal Ideation


Suicidal Ideation: No





- Homicidal Ideation


Homicidal Ideation: No





Goal/Treatment Plan





- Goal/Treatment Plan


Need for Continued Stay: Discharge may exacerbated symptoms, Severe functional 

impairment


Progress Toward Problem(s) and Goals/Treatment Plan: 





Taper with subutex - dose adjusted daily


Gabapentin for augmentation


Ativan if needed


Lexapro low dose started b/c of anxiety disorder


She doesn't want Lexapro for FILOMENA this time


CBT and supportive therapy for FILOMENA and depression


As needed medications


All risks, benefits and alternatives of the meds discussed,


 and the pt agreed and understood. 


Attend groups and activities


Supportive therapy and psychoeducation for substance use


MI for abstinence


CBT for relapse prevention


Encourage MAT


Refer to rehab or IOP, and self-help groups





Estimated Date of D/C: 07/12/18

## 2018-07-13 NOTE — PCM.PYCHDC
Mental Status Examination





- Mental Status Examination


Orientation: Person, Place, Situation, Time


Memory: Intact


Mood: Anxious


Affect: Constricted


Speech: Appropriate


Attention: WNL


Concentration: Poor


Association: WNL


Fund of Knowledge: WNL


Formal Thought Process: No Impairment


Suicidal Ideation: No


Current Homicidal Ideation?: No





Discharge Summary





- Discharge Note


Reason for Hospitalization: 





opioid detox


Psychiatric History (includes Medical, Family, Personal Hx): Depression, 

suicide attempt


Consultations:: List each consultation separately and include:  1. Reason for 

request.  2. Findings.  3. Follow-up


Summary of Hospital Course include:: 1. Description of specific treatment plan 

utilized for patients during their course of treatmen.  2. Summarize the time-

course for resolution of acute symptoms and/or regressed behaviors.  3. 

Describe issues identified and worked on during hospitalization.  4. Describe 

medication utilized.  5. Describe medical problems identified and treated.  6. 

Reassessment of suicide risk


Summary of Hospital Course: 





The patient is seen, chart reviewed and case discussed.





On admission:


She is known from a recent admission here.





This is a 43-year-old  female, homeless but now staying with a friend 

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 

 for 5 years, however she was living with her ex- up until May 

when he left for [facility].


The patient is also from [facility] and came here 15 years ago. She doesn't have a 

child.





The patient is here again for opioid detox; she was sent to [facility] in [facility] 

but she claims she had ongoing withdrawal sxs and so they discharged her after 4

-5 days and since then she was purchasing subutex from the streets and some 

seroquel, on which she accidentally overdosed the other day, because "I was not 

sleeping for 4 nights." 





She used to use 20 bags intranasally for the past 10 years. She had one detox 

in the past but never been to rehabilitation or  or used MAT until this year


She was/is also using crack cocaine but denies alcohol, cannabis and all other 

drugs. 


She says she couldn't stop on her own and kept on relapsing on subutex and 

sometimes heroin.





Past psych history: Depression and anxiety. No admissions and no suicide 

attempts





Medical history: Denies. 





Family psych history: Uncle had substance use and committed suicide when 

patient was a child








Hospital course:


The pt was admitted and started on treatment with psychotherapy, support, 

psychoeducation and medications. 


MI and CBT used.


The pt attended groups and activities, as well as milieu therapy.


All the risks and benefits of medications are discussed and the patient 

understood and agreed.


The pt improved with the treatments provided. 


After care discussed with the patient. She will go to [facility] for suboxone treatment





- Final Diagnosis (DSM 5)


Condition upon Discharge: STABLE


DSM 5: 





Opioid withdrawal


Opioid use disorder, severe


Cocaine use disorder, severe


Generalized anxiety disorder





Disposition: HOME/ ROUTINE


Follow-up Treatment Plan: 





Continue below medications after discharge.


Follow after care plan as discussed.


Use relapse prevention skills   


Return to ER or call 911 if suicidal, homicidal or symptoms relapse.


Stay away from stress, alcohol and drugs.


See primary doctor regularly and get labs.    





Prescriptions/Medication Reconciliation: 


Escitalopram [Lexapro] 5 mg PO DAILY #30 tab


Gabapentin [Neurontin] 300 mg PO TID #90 cap


Pantoprazole [Protonix EC Tab] 40 mg PO DAILY #30 ect


QUEtiapine [Seroquel] 100 mg PO HS #30 tab





- Smoking Cessation


Smoking Cessation Medication prescribed: No

## 2018-08-07 ENCOUNTER — HOSPITAL ENCOUNTER (EMERGENCY)
Dept: HOSPITAL 31 - C.ER | Age: 43
Discharge: LEFT BEFORE DISCHARGE | End: 2018-08-07
Payer: MEDICAID

## 2018-08-07 VITALS — SYSTOLIC BLOOD PRESSURE: 103 MMHG | DIASTOLIC BLOOD PRESSURE: 76 MMHG | HEART RATE: 78 BPM

## 2018-08-07 VITALS — RESPIRATION RATE: 16 BRPM

## 2018-08-07 VITALS — TEMPERATURE: 98.3 F | OXYGEN SATURATION: 99 %

## 2018-08-07 VITALS — BODY MASS INDEX: 16.6 KG/M2

## 2018-08-07 DIAGNOSIS — R55: Primary | ICD-10-CM

## 2018-08-07 LAB
ALBUMIN SERPL-MCNC: 3.9 G/DL (ref 3.5–5)
ALBUMIN/GLOB SERPL: 1.2 {RATIO} (ref 1–2.1)
ALT SERPL-CCNC: 23 U/L (ref 9–52)
AST SERPL-CCNC: 22 U/L (ref 14–36)
BACTERIA #/AREA URNS HPF: (no result) /[HPF]
BASOPHILS # BLD AUTO: 0.1 K/UL (ref 0–0.2)
BASOPHILS NFR BLD: 1.1 % (ref 0–2)
BILIRUB UR-MCNC: NEGATIVE MG/DL
BUN SERPL-MCNC: 12 MG/DL (ref 7–17)
CALCIUM SERPL-MCNC: 9.2 MG/DL (ref 8.6–10.4)
EOSINOPHIL # BLD AUTO: 0.1 K/UL (ref 0–0.7)
EOSINOPHIL NFR BLD: 1.6 % (ref 0–4)
ERYTHROCYTE [DISTWIDTH] IN BLOOD BY AUTOMATED COUNT: 12.9 % (ref 11.5–14.5)
GFR NON-AFRICAN AMERICAN: > 60
GLUCOSE UR STRIP-MCNC: NORMAL MG/DL
HCG,QUALITATIVE URINE: NEGATIVE
HGB BLD-MCNC: 12.6 G/DL (ref 11–16)
LEUKOCYTE ESTERASE UR-ACNC: (no result) LEU/UL
LYMPHOCYTES # BLD AUTO: 1.8 K/UL (ref 1–4.3)
LYMPHOCYTES NFR BLD AUTO: 32.9 % (ref 20–40)
MCH RBC QN AUTO: 31.9 PG (ref 27–31)
MCHC RBC AUTO-ENTMCNC: 34.3 G/DL (ref 33–37)
MCV RBC AUTO: 93.1 FL (ref 81–99)
MONOCYTES # BLD: 0.5 K/UL (ref 0–0.8)
MONOCYTES NFR BLD: 9.6 % (ref 0–10)
NEUTROPHILS # BLD: 2.9 K/UL (ref 1.8–7)
NEUTROPHILS NFR BLD AUTO: 54.8 % (ref 50–75)
NRBC BLD AUTO-RTO: 0 % (ref 0–2)
PH UR STRIP: 5 [PH] (ref 5–8)
PLATELET # BLD: 180 K/UL (ref 130–400)
PMV BLD AUTO: 9 FL (ref 7.2–11.7)
PROT UR STRIP-MCNC: NEGATIVE MG/DL
RBC # BLD AUTO: 3.96 MIL/UL (ref 3.8–5.2)
RBC # UR STRIP: NEGATIVE /UL
SP GR UR STRIP: 1.03 (ref 1–1.03)
SQUAMOUS EPITHIAL: 12 /HPF (ref 0–5)
UROBILINOGEN UR-MCNC: NORMAL MG/DL (ref 0.2–1)
WBC # BLD AUTO: 5.3 K/UL (ref 4.8–10.8)

## 2018-08-07 PROCEDURE — 93005 ELECTROCARDIOGRAM TRACING: CPT

## 2018-08-07 PROCEDURE — 84484 ASSAY OF TROPONIN QUANT: CPT

## 2018-08-07 PROCEDURE — 99285 EMERGENCY DEPT VISIT HI MDM: CPT

## 2018-08-07 PROCEDURE — 80053 COMPREHEN METABOLIC PANEL: CPT

## 2018-08-07 PROCEDURE — 85025 COMPLETE CBC W/AUTO DIFF WBC: CPT

## 2018-08-07 PROCEDURE — 84703 CHORIONIC GONADOTROPIN ASSAY: CPT

## 2018-08-07 PROCEDURE — 85378 FIBRIN DEGRADE SEMIQUANT: CPT

## 2018-08-07 PROCEDURE — 81001 URINALYSIS AUTO W/SCOPE: CPT

## 2018-08-07 PROCEDURE — 71046 X-RAY EXAM CHEST 2 VIEWS: CPT

## 2018-08-07 NOTE — CARD
--------------- APPROVED REPORT --------------





Date of service: 08/07/2018



EKG Measurement

Heart [id number]

OK 110P80

[id number]

[id number]

[id number]



<Conclusion>

Sinus rhythm with short OK

Possible Left atrial enlargement

Borderline ECG

## 2018-08-07 NOTE — RAD
Date of service: 



08/07/2018



HISTORY:

sob with exertion  



COMPARISON:

Chest radiographs 07/08/2018.



TECHNIQUE:

Chest PA and lateral



FINDINGS:



LUNGS:

No active pulmonary disease.



PLEURA:

No significant pleural effusion identified. No pneumothorax apparent.



CARDIOVASCULAR:

Normal.



OSSEOUS STRUCTURES:

No significant abnormalities.



VISUALIZED UPPER ABDOMEN:

Normal.



OTHER FINDINGS:

None.



IMPRESSION:

No interval acute cardiopulmonary disease appreciated.

## 2018-08-07 NOTE — C.PDOC
History Of Present Illness


43 year old female presents to the ED for evaluation after she reportedly had a 

syncopal episode yesterday. Patient states she is s/p 64 days of heroin detox. 

Patient reports experiencing shortness of breath with exertion ( usually with 

walking around 5 blocks), fatigue and weakness for the past week. After getting 

out of the pool yesterday, patient went to lay down on her towel and had a 

syncopal episode for a few seconds.  Patient denies chest pain or trauma/

injury. denies depressioin. ah, si and hi. 


Time Seen by Provider: 18 11:15


Chief Complaint (Nursing): Syncope


History Per: Patient


History/Exam Limitations: no limitations


Onset/Duration Of Symptoms: Days


Current Symptoms Are (Timing): Still Present


Number Of Syncopal Episodes: 1


Activity At Onset Of Symptoms: Lying


Associated Symptoms Preceding Syncopal Episode: No Predromal Symptoms (Sudden 

Onset)


Fall Associated With With Symptoms: No


Additional History Per: Patient





Past Medical History


Reviewed: Historical Data, Nursing Documentation, Vital Signs


Vital Signs: 


 Last Vital Signs











Temp  98.3 F   18 10:54


 


Pulse  78   18 15:33


 


Resp  16   18 15:33


 


BP  103/76   18 15:33


 


Pulse Ox  99   18 13:27














- Medical History


PMH: Anxiety, Depression


   Denies: Diabetes, Hepatitis, HIV, HTN, End Stage Renal Disease, Chronic 

Kidney Disease, Seizures, Sexually Transmitted Disease


Surgical History: No Surg Hx





- [facility] Procedures








DETOXIFICATION SERVICES FOR SUBSTANCE ABUSE TREATMENT (18)


GROUP  FOR SUBSTANCE ABUSE TREATMENT, PSYCHOEDUCATION (18)


GROUP  FOR SUBSTANCE ABUSE, COGNITIVE BEHAVIORAL (18)


GROUP PSYCHOTHERAPY (18)


INDIV PSYCHOTHERAPY FOR SUBSTANCE ABUSE TREATMENT, SUPPORT (18)


INDIV PSYCHOTHERAPY FOR SUBSTANCE ABUSE, COGNITIV BEHAVIORAL (18)


INDIV PSYCHOTHERAPY FOR SUBSTANCE ABUSE, PSYCHOEDUCATION (18)


INDIVIDUAL PSYCHOTHERAPY, COGNITIVE-BEHAVIORAL (18)


INDIVIDUAL PSYCHOTHERAPY, SUPPORTIVE (18)


INJECT/INFUSE NEC (14)








Family History: States: Diabetes





- Social History


Hx Alcohol Use: No


Hx Substance Use: Yes (stopped)





- Immunization History


Hx Tetanus Toxoid Vaccination: No


Hx Influenza Vaccination: No


Hx Pneumococcal Vaccination: No





Review Of Systems


Constitutional: Positive for: Weakness, Other (fatigue )


Cardiovascular: Negative for: Chest Pain


Respiratory: Positive for: Shortness of Breath


Neurological: Positive for: Other (syncopal episode )





Physical Exam





- Physical Exam


Appears: Non-toxic, No Acute Distress, Other (thin female )


Skin: Normal Color, Warm, Dry


Head: Atraumatic, Normacephalic


Eye(s): bilateral: Normal Inspection, PERRL, EOMI, Other (negative conjunctival 

pallor )


Oral Mucosa: Moist


Neck: Supple


Chest: Symmetrical, No Deformity, No Tenderness


Cardiovascular: Rhythm Regular, No Murmur


Respiratory: Normal Breath Sounds, No Rales, No Rhonchi, No Wheezing


Extremity: Normal ROM, Capillary Refill (less than 2 seconds )


Neurological/Psych: Oriented x3, Normal Speech, Normal Cognition





ED Course And Treatment





- Laboratory Results


Result Diagrams: 


 18 11:48





 18 11:48


ECG: Interpreted By Me, Viewed By Me


ECG Rhythm: Sinus Rhythm


Rate From EC


O2 Sat by Pulse Oximetry: 99 (on RA)


Pulse Ox Interpretation: Normal





- Other Rad


  ** CXR


X-Ray: Interpreted by Me, Viewed By Me, Read By Radiologist


Interpretation: HISTORY:  sob with exertion.  COMPARISON:  Chest radiographs 2018.  TECHNIQUE:  Chest PA and lateral.  FINDINGS:  LUNGS:  No active 

pulmonary disease.  PLEURA:  No significant pleural effusion identified. No 

pneumothorax apparent.  CARDIOVASCULAR:  Normal.  OSSEOUS STRUCTURES:  No 

significant abnormalities.  VISUALIZED UPPER ABDOMEN:  Normal.  OTHER FINDINGS:

  None.  IMPRESSION:  No interval acute cardiopulmonary disease appreciated.





Medical Decision Making


Medical Decision Making: 








Progress: 


Bloodwork, urinalysis, CXR, EKG ordered and reviewed. 


IV fluids given. 





pt with fatigue, sob with exertion., syncope yesterday.  normal labs pending 

ddimer and trop. will admit to tele for observation, discussed with Dr AMERICO Carvajal. 





Disposition


Discussed With : Suzy Carvajal





- Disposition


Disposition: AGAINST MEDICAL ADVICE


Disposition Time: 12:57


Condition: GOOD


Forms:  [facility] (English)





- Clinical Impression


Clinical Impression: 


 Syncope








- PA / NP / Resident Statement


MD/DO has reviewed & agrees with the documentation as recorded.





- Scribe Statement


The provider has reviewed the documentation as recorded by the Scribe (Mickie Carvajal)








All medical record entries made by the Scribe were at my direction and 

personally dictated by me. I have reviewed the chart and agree that the record 

accurately reflects my personal performance of the history, physical exam, 

medical decision making, and the department course for this patient. I have 

also personally directed, reviewed, and agree with the discharge instructions 

and disposition.

## 2018-09-16 ENCOUNTER — HOSPITAL ENCOUNTER (EMERGENCY)
Dept: HOSPITAL 14 - H.ER | Age: 43
Discharge: HOME | End: 2018-09-16
Payer: MEDICAID

## 2018-09-16 VITALS
HEART RATE: 70 BPM | SYSTOLIC BLOOD PRESSURE: 110 MMHG | RESPIRATION RATE: 20 BRPM | TEMPERATURE: 98.9 F | OXYGEN SATURATION: 99 % | DIASTOLIC BLOOD PRESSURE: 83 MMHG

## 2018-09-16 VITALS — BODY MASS INDEX: 16.6 KG/M2

## 2018-09-16 DIAGNOSIS — F43.22: Primary | ICD-10-CM

## 2018-09-16 NOTE — ED PDOC
HPI: Psych/Substance Abuse


Time Seen by Provider: 18 13:30


Chief Complaint (Nursing): Psychiatric Evaluation


Chief Complaint (Provider): crisis eval


History Per: Patient


Additional Complaint(s): 


42 y/o female presents for crisis eval.  Patient had an argument with roommate 

and expressed thoughts of wanting to harm herself so roommate called the 

police.  Patient was brought her for crisis eval.  Upon arrival she denies any 

suicidal or homicidal ideation.  Patient has h/o heroin abuse but has been 

clean for 100 days.





PMD: none





Past Medical History


Reviewed: Historical Data, Nursing Documentation, Vital Signs


Vital Signs: 





 Last Vital Signs











Temp  98.9 F   18 13:26


 


Pulse  70   18 13:26


 


Resp  20   18 13:26


 


BP  110/83   18 13:26


 


Pulse Ox  99   18 13:26














- Medical History


PMH: Anxiety, Depression





- Family History


Family History: States: Diabetes





- Living Arrangements


Living Arrangements: With Friends/Others





- Social History


Current smoker - smoking cessation education provided: Yes


Alcohol: None


Drugs: Other (clean from heroin for 100 days)





- Home Medications


Home Medications: 


 Ambulatory Orders











 Medication  Instructions  Recorded


 


Escitalopram [Lexapro] 5 mg PO DAILY #30 tab 18














- Allergies


Allergies/Adverse Reactions: 


 Allergies











Allergy/AdvReac Type Severity Reaction Status Date / Time


 


No Known Allergies Allergy   Verified 18 13:26














Review of Systems


ROS Statement: Except As Marked, All Systems Reviewed And Found Negative


Constitutional: Negative for: Fever


Cardiovascular: Negative for: Chest Pain


Respiratory: Negative for: Cough


Gastrointestinal: Negative for: Nausea, Vomiting


Neurological: Negative for: Dizziness


Psych: Negative for: Suicidal ideation





Physical Exam





- Reviewed


Nursing Documentation Reviewed: Yes


Vital Signs Reviewed: Yes





- Physical Exam


Appears: Positive for: Well, Non-toxic, No Acute Distress


Skin: Positive for: Normal Color.  Negative for: Rash


Eye Exam: Positive for: Normal appearance


Cardiovascular/Chest: Positive for: Regular Rate, Rhythm


Respiratory: Positive for: Normal Breath Sounds.  Negative for: Wheezing, 

Respiratory Distress


Neurologic/Psych: Positive for: Alert, Oriented, Mood/Affect (appropriate)





- ECG


O2 Sat by Pulse Oximetry: 99


Pulse Ox Interpretation: Normal





Medical Decision Making


Medical Decision Makin y/o female here for crisis eval





Plan:


Crisis counselor





As per crisis counselor and psychiatrist on call Dr. Jacome, patient does not 

meet criteria for admission and is stable for discharge.





Disposition





- Clinical Impression


Clinical Impression: 


 Adjustment disorder








- Patient ED Disposition


Is Patient to be Admitted: No


Counseled Patient/Family Regarding: Need For Followup





- Disposition


Referrals: 


[facility] [Outside]


Disposition: Routine/Home


Disposition Time: 13:50


Condition: STABLE


Additional Instructions: 


Follow up as directed. 


Instructions:  Adjustment Disorder


Forms:  [facility] (English)

## 2018-10-22 ENCOUNTER — HOSPITAL ENCOUNTER (EMERGENCY)
Dept: HOSPITAL 14 - H.ER | Age: 43
Discharge: HOME | End: 2018-10-22
Payer: MEDICAID

## 2018-10-22 VITALS — TEMPERATURE: 98.1 F | OXYGEN SATURATION: 99 %

## 2018-10-22 VITALS — BODY MASS INDEX: 16.6 KG/M2

## 2018-10-22 VITALS — HEART RATE: 66 BPM

## 2018-10-22 VITALS — SYSTOLIC BLOOD PRESSURE: 108 MMHG | RESPIRATION RATE: 18 BRPM | DIASTOLIC BLOOD PRESSURE: 56 MMHG

## 2018-10-22 DIAGNOSIS — Z79.899: ICD-10-CM

## 2018-10-22 DIAGNOSIS — Z86.59: ICD-10-CM

## 2018-10-22 DIAGNOSIS — J20.9: Primary | ICD-10-CM

## 2018-10-22 LAB
ALBUMIN SERPL-MCNC: 4.4 G/DL (ref 3.5–5)
ALBUMIN/GLOB SERPL: 1.2 {RATIO} (ref 1–2.1)
ALT SERPL-CCNC: 21 U/L (ref 9–52)
AST SERPL-CCNC: 29 U/L (ref 14–36)
BASE EXCESS BLDV CALC-SCNC: 4.3 MMOL/L (ref 0–2)
BASOPHILS # BLD AUTO: 0.1 K/UL (ref 0–0.2)
BASOPHILS NFR BLD: 0.5 % (ref 0–2)
BUN SERPL-MCNC: 14 MG/DL (ref 7–17)
CALCIUM SERPL-MCNC: 9.1 MG/DL (ref 8.4–10.2)
EOSINOPHIL # BLD AUTO: 0.1 K/UL (ref 0–0.7)
EOSINOPHIL NFR BLD: 0.9 % (ref 0–4)
ERYTHROCYTE [DISTWIDTH] IN BLOOD BY AUTOMATED COUNT: 12.1 % (ref 11.5–14.5)
GFR NON-AFRICAN AMERICAN: > 60
HGB BLD-MCNC: 14.1 G/DL (ref 12–16)
LYMPHOCYTES # BLD AUTO: 1.9 K/UL (ref 1–4.3)
LYMPHOCYTES NFR BLD AUTO: 18.4 % (ref 20–40)
MCH RBC QN AUTO: 33.1 PG (ref 27–31)
MCHC RBC AUTO-ENTMCNC: 34.9 G/DL (ref 33–37)
MCV RBC AUTO: 94.9 FL (ref 81–99)
MONOCYTES # BLD: 0.6 K/UL (ref 0–0.8)
MONOCYTES NFR BLD: 5.8 % (ref 0–10)
NEUTROPHILS # BLD: 7.8 K/UL (ref 1.8–7)
NEUTROPHILS NFR BLD AUTO: 74.4 % (ref 50–75)
NRBC BLD AUTO-RTO: 0 % (ref 0–0)
PCO2 BLDV: 57 MMHG (ref 40–60)
PH BLDV: 7.35 [PH] (ref 7.32–7.43)
PLATELET # BLD: 216 K/UL (ref 130–400)
PMV BLD AUTO: 9 FL (ref 7.2–11.7)
RBC # BLD AUTO: 4.27 MIL/UL (ref 3.8–5.2)
VENOUS BLOOD FIO2: 21 %
VENOUS BLOOD GAS PO2: 14 MM/HG (ref 30–55)
WBC # BLD AUTO: 10.5 K/UL (ref 4.8–10.8)

## 2018-10-22 NOTE — CARD
--------------- APPROVED REPORT --------------





Date of service: 10/22/2018



EKG Measurement

Heart [id number]

[place] 116P62

[id number]

[id number]

[id number]



<Conclusion>

Normal sinus rhythm

Normal ECG

## 2018-10-22 NOTE — RAD
Date of service: 



10/22/2018



HISTORY:

 cough 



COMPARISON:

No prior.



TECHNIQUE:

Chest PA and lateral



FINDINGS:



LUNGS:

No active pulmonary disease.



PLEURA:

No significant pleural effusion identified. No pneumothorax apparent.



CARDIOVASCULAR:

No aortic atherosclerotic calcification present







OSSEOUS STRUCTURES:

No significant abnormalities.



VISUALIZED UPPER ABDOMEN:

Normal.



OTHER FINDINGS:

None.



IMPRESSION:

No active disease.

## 2018-10-22 NOTE — ED PDOC
HPI: CCC, URI, Sore Throat


Time Seen by Provider: 10/22/18 11:05


Chief Complaint (Nursing): Cough, Cold, Congestion


Chief Complaint (Provider): Cough


History Per: Patient


History/Exam Limitations: no limitations


Onset/Duration Of Symptoms: Other (x1 week)


Current Symptoms Are (Timing): Still Present


Additional Complaint(s): 





43 year old female presents to the ED complaining of cough for the past week.  

Patient reports yesterday, developed nonproductive cough became worse with 

constant mild sternal chest pain.  She states pain is worse with deep 

inspiration.  States [facility] at the shelter.  Denies hemoptysis, fever, recent

travel, leg pain, history of DVT or PE, hormonal therapy, or recent prolonged 

immobilization of limbs.





PMD: none





Past Medical History


Reviewed: Historical Data, Nursing Documentation, Vital Signs


Vital Signs: 





                                Last Vital Signs











Temp  98.1 F   10/22/18 11:15


 


Pulse  105 H  10/22/18 11:15


 


Resp  80 H  10/22/18 11:15


 


BP  110/73   10/22/18 11:15


 


Pulse Ox  99   10/22/18 11:15














- Medical History


PMH: Anxiety, Depression


   Denies: Diabetes, Hepatitis, HIV, HTN, End Stage Renal Disease, Chronic 

Kidney Disease, Seizures, Sexually Transmitted Disease





- Surgical History


Surgical History: No Surg Hx





- Family History


Family History: States: Diabetes





- Immunization History


Hx Tetanus Toxoid Vaccination: No


Hx Influenza Vaccination: No


Hx Pneumococcal Vaccination: No





- Home Medications


Home Medications: 


                                Ambulatory Orders











 Medication  Instructions  Recorded


 


RX: Escitalopram [Lexapro] 5 mg PO DAILY #30 tab 07/12/18


 


Albuterol HFA [Ventolin HFA 90 2 puff IH [id number] PRN #120 puff 10/22/18





mcg/actuation (8 g)]  


 


Azithromycin [Zithromax] 250 mg PO DAILY #6 tab 10/22/18


 


RX: Promethazine DM [Phenergan DM 5 - 10 ml PO Q8 PRN #120 ml 10/22/18





Syrup]  














- Allergies


Allergies/Adverse Reactions: 


                                    Allergies











Allergy/AdvReac Type Severity Reaction Status Date / Time


 


No Known Allergies Allergy   Verified 10/22/18 11:15














Review of Systems


ROS Statement: Except As Marked, All Systems Reviewed And Found Negative


Constitutional: Negative for: Fever


Cardiovascular: Positive for: Chest Pain (mid sternal chest pain)


Respiratory: Positive for: Cough.  Negative for: Hemoptysis


Musculoskeletal: Negative for: Leg Pain





Physical Exam





- Reviewed


Nursing Documentation Reviewed: Yes


Vital Signs Reviewed: Yes





- Physical Exam


Appears: Positive for: Non-toxic, No Acute Distress


Head Exam: Positive for: ATRAUMATIC, NORMOCEPHALIC


Skin: Positive for: Normal Color, Warm, Dry


Eye Exam: Positive for: Normal appearance


ENT: Positive for: Normal ENT Inspection


Neck: Positive for: Normal, Painless ROM


Cardiovascular/Chest: Positive for: Regular Rate, Rhythm, Tachycardia


Respiratory: Positive for: Normal Breath Sounds.  Negative for: Wheezing, 

Respiratory Distress


Gastrointestinal/Abdominal: Positive for: Normal Exam, Soft.  Negative for: 

Tenderness


Extremity: Positive for: Normal ROM.  Negative for: Calf Tenderness 

(bilaterally)


Neurologic/Psych: Positive for: Alert, Oriented.  Negative for: Motor/Sensory 

Deficits





- Laboratory Results


Result Diagrams: 


                                 10/22/18 12:32





                                 10/22/18 12:32





- ECG


ECG: Positive for: Interpreted By Me


ECG Rhythm: Positive for: Sinus Rhythm.  Negative for: ST/T Changes


Rate: 66


O2 Sat by Pulse Oximetry: 99 (RA)


Pulse Ox Interpretation: Normal





- Radiology


X-Ray: Read By Radiologist (CXR)


X-Ray Interpretation: No Acute Disease





- Progress


ED Course And Treament: 





Pt. informed of results.


On re-evaluation, pt. in no distress. Lungs clear b/l. Cardiac RRR.





Medical Decision Making


Medical Decision Making: 





Initial Impression: Cough, chest pain, palpitations





Initial Plan: 


--VBG


--ECG


--CMP


--Drug screen


--Troponin


--ED urine pregnancy


--CBC


--D Dimer


--Chest X-ray


--Blood culture


 

--------------------------------------------------------------------------------


-----------------


Scribe Attestation:


Documented by David Varela acting as a scribe for Kalia DALEY





Provider Scribe Attestation:


All medical record entries made by the Scribe were at my direction and 

personally dictated by me. I have reviewed the chart and agree that the record 

accurately reflects my personal performance of the history, physical exam, 

medical decision making, and the department course for this patient. I have also

personally directed, reviewed, and agree with the discharge instructions and 

disposition.





Disposition





- Clinical Impression


Clinical Impression: 


 Acute bronchitis








- Patient ED Disposition


Is Patient to be Admitted: No


Counseled Patient/Family Regarding: Need For Followup





- Disposition


Referrals: 


[facility] [Outside]


Disposition: Routine/Home


Disposition Time: 13:43


Condition: STABLE


Additional Instructions: 





JOHNNY REAL, thank you for letting us take care of you today. Your 

provider was Cat Harvey MD and you were treated for COUGH. The 

emergency medical care you received today was directed at your acute symptoms. 

If you were prescribed any medication, please fill it and take as directed. It 

may take several days for your symptoms to resolve. Return to the Emergency 

Department if your symptoms worsen, do not improve, or if you have any other 

problems.





Please contact your doctor or call one of the physicians/clinics you have been 

referred to that are listed on the Patient Visit Information form that is 

included in your discharge packet. Bring any paperwork you were given at 

discharge with you along with any medications you are taking to your follow up 

visit. Our treatment cannot replace ongoing medical care by a primary care 

provider outside of the emergency department.





Thank you for allowing the [facility] team to be part of your care today.








If you had an X-Ray or CT scan: A Radiologist will review the ED reading if any 

change in treatment is needed we will contact you.***





If you had a blood, urine, or wound culture: It will take several days for the 

results, if any change in treatment is needed we will contact you.***





If you had an STI test: It will take 48 hours for the results. Please call after

 1 week if you have not heard back.***


Prescriptions: 


Albuterol HFA [Ventolin HFA 90 mcg/actuation (8 g)] 2 puff IH [id number] PRN #120 

puff


 PRN Reason: Cough


Azithromycin [Zithromax] 250 mg PO DAILY #6 tab


RX: Promethazine DM [Phenergan DM Syrup] 5 - 10 ml PO Q8 PRN #120 ml


 PRN Reason: Cough


Instructions:  Acute Bronchitis, Adult (DC)


Forms:  [employer] [facility] (English), [facility] ED School/Work Excuse


Print Language: ENGLISH

## 2018-11-16 ENCOUNTER — HOSPITAL ENCOUNTER (EMERGENCY)
Dept: HOSPITAL 14 - H.ER | Age: 43
Discharge: HOME | End: 2018-11-16
Payer: COMMERCIAL

## 2018-11-16 VITALS — BODY MASS INDEX: 16.6 KG/M2

## 2018-11-16 VITALS — RESPIRATION RATE: 18 BRPM | TEMPERATURE: 98.2 F | OXYGEN SATURATION: 100 %

## 2018-11-16 VITALS — HEART RATE: 82 BPM | DIASTOLIC BLOOD PRESSURE: 74 MMHG | SYSTOLIC BLOOD PRESSURE: 112 MMHG

## 2018-11-16 DIAGNOSIS — F17.200: ICD-10-CM

## 2018-11-16 DIAGNOSIS — Z86.59: ICD-10-CM

## 2018-11-16 DIAGNOSIS — M77.42: Primary | ICD-10-CM

## 2018-11-16 NOTE — RAD
Date of service: 



11/16/2018



PROCEDURE:  Left Foot Radiographs.



HISTORY:

 atruamatic pain to 3-4th metatarsals 



COMPARISON:

None.



FINDINGS:



BONES:

Normal. No fracture. 



JOINTS:

Normal. 



SOFT TISSUES:

Normal. 



OTHER FINDINGS:

None.



IMPRESSION:

No evidence of acute fracture or dislocation.

## 2018-11-16 NOTE — ED PDOC
Lower Extremity Pain/Injury


Time Seen by Provider: 11/16/18 15:29


Chief Complaint (Nursing): Lower Extremity Problem/Injury


Chief Complaint (Provider): Left Foot Pain


History Per: Patient


History/Exam Limitations: no limitations


Onset/Duration Of Symptoms: Days (3), Worse Since


Current Symptoms Are (Timing): Still Present


Additional Complaint(s): 


43 year old female presents to the ER for an evaluation of left foot pain onset 

for 2-3 days. Patient noticed her foot was swollen with bruises today. She works

at [employer] and is on her feet all day. Also reports her left foot becomes 

cramped at the bottom. 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 Denies

trauma, injury or medication for pain. 


PMD: Eric Hampton 








Past Medical History


Reviewed: Historical Data, Nursing Documentation, Vital Signs


Vital Signs: 





                                Last Vital Signs











Temp  98.2 F   11/16/18 14:40


 


Pulse  76   11/16/18 14:40


 


Resp  18   11/16/18 14:40


 


BP  102/70   11/16/18 14:40


 


Pulse Ox  100   11/16/18 14:40














- Medical History


PMH: Anxiety, Depression


   Denies: Diabetes, Hepatitis, HIV, HTN, End Stage Renal Disease, Chronic 

Kidney Disease, Seizures, Sexually Transmitted Disease





- Family History


Family History: States: Diabetes





- Social History


Current smoker - smoking cessation education provided: Yes (Current Some Days 

Smoker)


Alcohol: None





- Immunization History


Hx Tetanus Toxoid Vaccination: No


Hx Influenza Vaccination: No


Hx Pneumococcal Vaccination: No





- Home Medications


Home Medications: 


                                Ambulatory Orders











 Medication  Instructions  Recorded


 


Escitalopram [Lexapro] 5 mg PO DAILY #30 tab 07/12/18


 


Albuterol HFA [Ventolin HFA 90 2 puff IH [id number] PRN #120 puff 10/22/18





mcg/actuation (8 g)]  


 


Azithromycin [Zithromax] 250 mg PO DAILY #6 tab 10/22/18


 


Promethazine DM [Phenergan DM 5 - 10 ml PO Q8 PRN #120 ml 10/22/18





Syrup]  


 


Ibuprofen [Motrin] 600 mg PO Q6 #20 tab 11/16/18














- Allergies


Allergies/Adverse Reactions: 


                                    Allergies











Allergy/AdvReac Type Severity Reaction Status Date / Time


 


No Known Allergies Allergy   Verified 10/22/18 11:15














Review of Systems


ROS Statement: Except As Marked, All Systems Reviewed And Found Negative


Constitutional: Negative for: Fever, Chills


Musculoskeletal: Positive for: Foot Pain (left)


Skin: Positive for: Bruising





Physical Exam





- Reviewed


Nursing Documentation Reviewed: Yes


Vital Signs Reviewed: Yes





- Physical Exam


Appears: Positive for: Non-toxic, No Acute Distress


Head Exam: Positive for: ATRAUMATIC, NORMAL INSPECTION, NORMOCEPHALIC


Skin: Positive for: Normal Color, Warm, Dry.  Negative for: Rash


Eye Exam: Positive for: EOMI, Normal appearance, PERRL


Extremity: Positive for: Normal ROM, Tenderness (and 3rd metatarsal).  Negative 

for: Deformity


Neurologic/Psych: Positive for: Alert, Oriented (x3), Gait (steady).  Negative 

for: Motor/Sensory Deficits





- ECG


O2 Sat by Pulse Oximetry: 100 (RA)


Pulse Ox Interpretation: Normal





Medical Decision Making


Medical Decision Making: 


Time: 1614


Initial Plan:


Foot Left 3 Views [RAD]


Reevaluation    





XR: NAD, as read by MAME








Podiatry referral administered. RICE therapy advised. 





Pt offered splinting and declined





 

--------------------------------------------------------------------------------


-----------------   


Scribe Attestation:


Documented by Tamica Winston, acting as a scribe for Kylie Cartagena PA-C





Provider Scribe Attestation:


All medical record entries made by the Scribe were at my direction and 

personally dictated by me. I have reviewed the chart and agree that the record 

accurately reflects my personal performance of the history, physical exam, 

medical decision making, and the department course for this patient. I have also

 personally directed, reviewed, and agree with the discharge instructions and 

disposition.








Disposition





- Clinical Impression


Clinical Impression: 


 Metatarsalgia








- Patient ED Disposition


Is Patient to be Admitted: No





- Disposition


Referrals: 


Podiatry Clinic [Outside]


Disposition: Routine/Home


Disposition Time: 17:08


Condition: GOOD


Prescriptions: 


Ibuprofen [Motrin] 600 mg PO Q6 #20 tab


Instructions:  Metatarsalgia


Forms:  [facility] (English)

## 2018-12-08 ENCOUNTER — HOSPITAL ENCOUNTER (EMERGENCY)
Dept: HOSPITAL 14 - H.ER | Age: 43
Discharge: HOME | End: 2018-12-08
Payer: SELF-PAY

## 2018-12-08 VITALS
SYSTOLIC BLOOD PRESSURE: 120 MMHG | TEMPERATURE: 97.6 F | HEART RATE: 78 BPM | RESPIRATION RATE: 19 BRPM | OXYGEN SATURATION: 98 % | DIASTOLIC BLOOD PRESSURE: 78 MMHG

## 2018-12-08 VITALS — BODY MASS INDEX: 17.4 KG/M2

## 2018-12-08 DIAGNOSIS — Z76.0: ICD-10-CM

## 2018-12-08 DIAGNOSIS — F32.9: Primary | ICD-10-CM

## 2018-12-08 DIAGNOSIS — Z00.8: ICD-10-CM

## 2018-12-08 NOTE — ED PDOC
HPI: Psych/Substance Abuse


Time Seen by Provider: 12/08/18 09:46


Chief Complaint (Provider): Depression and Prescription Refill


History Per: Patient


History/Exam Limitations: no limitations


Additional Complaint(s): 





43 year old female with depression presents to the ED for depression and 

prescription refill. Patient states her insurance was cancelled so the mental 

health clinic refused to see her. She presents here for a prescription for Tanner

pro 5 mg daily, her last dose was yesterday. Patient denes SI, HI or any other 

medical complaints.





PMD: none provided





Past Medical History


Reviewed: Historical Data, Nursing Documentation, Vital Signs


Vital Signs: 





                                Last Vital Signs











Temp  98.3 F   12/08/18 09:38


 


Pulse  79   12/08/18 09:38


 


Resp  16   12/08/18 09:38


 


BP  115/74   12/08/18 09:38


 


Pulse Ox  100   12/08/18 09:38














- Medical History


PMH: Anxiety, Depression


   Denies: Diabetes, Hepatitis, HIV, HTN, End Stage Renal Disease, Chronic 

Kidney Disease, Seizures, Sexually Transmitted Disease





- Family History


Family History: States: Diabetes





- Social History


Current smoker - smoking cessation education provided: Yes


Ex-Smoker (has not smoked in the last 12 months): No


Drugs: Denies





- Immunization History


Hx Tetanus Toxoid Vaccination: No


Hx Influenza Vaccination: No


Hx Pneumococcal Vaccination: No





- Home Medications


Home Medications: 


                                Ambulatory Orders











 Medication  Instructions  Recorded


 


Escitalopram [Lexapro] 5 mg PO DAILY #30 tab 07/12/18


 


Albuterol HFA [Ventolin HFA 90 2 puff IH [id number] PRN #120 puff 10/22/18





mcg/actuation (8 g)]  


 


Azithromycin [Zithromax] 250 mg PO DAILY #6 tab 10/22/18


 


Promethazine DM [Phenergan DM 5 - 10 ml PO Q8 PRN #120 ml 10/22/18





Syrup]  


 


Ibuprofen [Motrin] 600 mg PO Q6 #20 tab 11/16/18


 


Escitalopram [Lexapro] 5 mg PO DAILY #30 tab 12/08/18














- Allergies


Allergies/Adverse Reactions: 


                                    Allergies











Allergy/AdvReac Type Severity Reaction Status Date / Time


 


No Known Allergies Allergy   Verified 10/22/18 11:15














Review of Systems


ROS Statement: Except As Marked, All Systems Reviewed And Found Negative


Psych: Positive for: Other (no HI).  Negative for: Suicidal ideation





Physical Exam





- Reviewed


Nursing Documentation Reviewed: Yes


Vital Signs Reviewed: Yes





- Physical Exam


Appears: Positive for: Non-toxic, No Acute Distress


Head Exam: Positive for: ATRAUMATIC, NORMOCEPHALIC


Skin: Positive for: Normal Color, Warm, Dry


Eye Exam: Positive for: Normal appearance, EOMI, PERRL


ENT: Positive for: Normal ENT Inspection


Neck: Positive for: Normal


Cardiovascular/Chest: Positive for: Regular Rate, Rhythm.  Negative for: Murmur


Respiratory: Positive for: Normal Breath Sounds.  Negative for: Respiratory 

Distress


Gastrointestinal/Abdominal: Positive for: Normal Exam, Soft.  Negative for: 

Tenderness


Extremity: Positive for: Normal ROM (upper and lower).  Negative for: Pedal 

Edema, Deformity


Neurologic/Psych: Positive for: Alert, Oriented (x3)





- ECG


O2 Sat by Pulse Oximetry: 100 (RA)


Pulse Ox Interpretation: Normal





Medical Decision Making


Medical Decision Making: 





Time: 0956


Initial Impression: Depression, Prescription Refill


Initial Plan:


--Crisis Evaluation.





--Patient evaluated by Crisis. As per Dr. Dick, patient can be sent home, 

requesting prescription for Lexapro for patient.








----

--------------------------------------------------------------------------------


-------------


Scribe Attestation:


Documented by Joycelyn Varela, acting as a scribe for Desiree Britton MD 





Provider Scribe Attestation:


All medical record entries made by the Scribe were at my direction and 

personally dictated by me. I have reviewed the chart and agree that the record 

accurately reflects my personal performance of the history, physical exam, 

medical decision making, and the department course for this patient. I have also

 personally directed, reviewed, and agree with the discharge instructions and 

disposition.





Disposition





- Clinical Impression


Clinical Impression: 


 Depression, Prescription refill








- Patient ED Disposition


Is Patient to be Admitted: No





- Disposition


Referrals: 


[facility] [Outside]


Disposition: Routine/Home


Disposition Time: 09:57


Condition: STABLE


Prescriptions: 


Escitalopram [Lexapro] 5 mg PO DAILY #30 tab


Instructions:  Depression, Medicines for Depression

## 2019-01-05 ENCOUNTER — HOSPITAL ENCOUNTER (EMERGENCY)
Dept: HOSPITAL 42 - ED | Age: 44
Discharge: HOME | End: 2019-01-05
Payer: MEDICAID

## 2019-01-05 VITALS — TEMPERATURE: 98 F | RESPIRATION RATE: 19 BRPM

## 2019-01-05 VITALS — DIASTOLIC BLOOD PRESSURE: 53 MMHG | OXYGEN SATURATION: 100 % | HEART RATE: 85 BPM | SYSTOLIC BLOOD PRESSURE: 129 MMHG

## 2019-01-05 VITALS — BODY MASS INDEX: 17.4 KG/M2

## 2019-01-05 DIAGNOSIS — R10.9: Primary | ICD-10-CM

## 2019-01-05 DIAGNOSIS — K59.00: ICD-10-CM

## 2019-01-05 DIAGNOSIS — F41.9: ICD-10-CM

## 2019-01-05 LAB
ALBUMIN SERPL-MCNC: 4.6 G/DL (ref 3–4.8)
ALBUMIN/GLOB SERPL: 1.3 {RATIO} (ref 1.1–1.8)
ALT SERPL-CCNC: 28 U/L (ref 7–56)
AMORPH SED URNS QL MICRO: (no result) /HPF
APPEARANCE UR: CLEAR
AST SERPL-CCNC: 26 U/L (ref 14–36)
BASOPHILS # BLD AUTO: 0.06 K/MM3 (ref 0–2)
BASOPHILS NFR BLD: 0.5 % (ref 0–3)
BILIRUB UR-MCNC: NEGATIVE MG/DL
BUN SERPL-MCNC: 13 MG/DL (ref 7–21)
CALCIUM SERPL-MCNC: 10.9 MG/DL (ref 8.4–10.5)
COLOR UR: YELLOW
EOSINOPHIL # BLD: 0.2 10*3/UL (ref 0–0.7)
EOSINOPHIL NFR BLD: 1.2 % (ref 1.5–5)
EPI CELLS #/AREA URNS HPF: (no result) /HPF (ref 0–5)
ERYTHROCYTE [DISTWIDTH] IN BLOOD BY AUTOMATED COUNT: 12.1 % (ref 11.5–14.5)
GFR NON-AFRICAN AMERICAN: > 60
GLUCOSE UR STRIP-MCNC: NEGATIVE MG/DL
GRANULOCYTES # BLD: 9.53 10*3/UL (ref 1.4–6.5)
GRANULOCYTES NFR BLD: 74.1 % (ref 50–68)
HCG,QUALITATIVE URINE: NEGATIVE
HGB BLD-MCNC: 14.6 G/DL (ref 12–16)
LEUKOCYTE ESTERASE UR-ACNC: NEGATIVE LEU/UL
LIPASE SERPL-CCNC: 145 U/L (ref 23–300)
LYMPHOCYTES # BLD: 2.3 10*3/UL (ref 1.2–3.4)
LYMPHOCYTES NFR BLD AUTO: 18.1 % (ref 22–35)
MCH RBC QN AUTO: 31.6 PG (ref 25–35)
MCHC RBC AUTO-ENTMCNC: 33.8 G/DL (ref 31–37)
MCV RBC AUTO: 93.5 FL (ref 80–105)
MONOCYTES # BLD AUTO: 0.8 10*3/UL (ref 0.1–0.6)
MONOCYTES NFR BLD: 6.1 % (ref 1–6)
PH UR STRIP: 8 [PH] (ref 4.7–8)
PLATELET # BLD: 272 10^3/UL (ref 120–450)
PMV BLD AUTO: 10.4 FL (ref 7–11)
PROT UR STRIP-MCNC: (no result) MG/DL
RBC # BLD AUTO: 4.62 10^6/UL (ref 3.5–6.1)
RBC # UR STRIP: NEGATIVE /UL
RBC #/AREA URNS HPF: NEGATIVE /HPF (ref 0–2)
SP GR UR STRIP: 1.02 (ref 1–1.03)
TROPONIN I SERPL-MCNC: < 0.01 NG/ML
UROBILINOGEN UR STRIP-ACNC: 0.2 E.U./DL
WBC # BLD AUTO: 12.9 10^3/UL (ref 4.5–11)
WBC #/AREA URNS HPF: NEGATIVE /HPF (ref 0–6)

## 2019-01-05 PROCEDURE — 84484 ASSAY OF TROPONIN QUANT: CPT

## 2019-01-05 PROCEDURE — 85025 COMPLETE CBC W/AUTO DIFF WBC: CPT

## 2019-01-05 PROCEDURE — 84703 CHORIONIC GONADOTROPIN ASSAY: CPT

## 2019-01-05 PROCEDURE — 93005 ELECTROCARDIOGRAM TRACING: CPT

## 2019-01-05 PROCEDURE — 74177 CT ABD & PELVIS W/CONTRAST: CPT

## 2019-01-05 PROCEDURE — 82550 ASSAY OF CK (CPK): CPT

## 2019-01-05 PROCEDURE — 71045 X-RAY EXAM CHEST 1 VIEW: CPT

## 2019-01-05 PROCEDURE — 96374 THER/PROPH/DIAG INJ IV PUSH: CPT

## 2019-01-05 PROCEDURE — 80053 COMPREHEN METABOLIC PANEL: CPT

## 2019-01-05 PROCEDURE — 81001 URINALYSIS AUTO W/SCOPE: CPT

## 2019-01-05 PROCEDURE — 83690 ASSAY OF LIPASE: CPT

## 2019-01-05 PROCEDURE — 96375 TX/PRO/DX INJ NEW DRUG ADDON: CPT

## 2019-01-05 PROCEDURE — 83615 LACTATE (LD) (LDH) ENZYME: CPT

## 2019-01-05 PROCEDURE — 76700 US EXAM ABDOM COMPLETE: CPT

## 2019-01-05 PROCEDURE — 99284 EMERGENCY DEPT VISIT MOD MDM: CPT

## 2019-01-05 NOTE — RAD
Date of service: 



01/05/2019



HISTORY:

 abd pain 



COMPARISON:

No prior. 



FINDINGS:



LUNGS:

No active pulmonary disease.



PLEURA:

No significant pleural effusion identified, no pneumothorax apparent.



CARDIOVASCULAR:

No aortic atherosclerotic calcification present.



Normal cardiac size. No pulmonary vascular congestion. 



OSSEOUS STRUCTURES:

No significant abnormalities.



VISUALIZED UPPER ABDOMEN:

Normal.



OTHER FINDINGS:

None.



IMPRESSION:

No active disease.

## 2019-01-05 NOTE — CT
Date of service: 



01/05/2019



PROCEDURE:  CT Abdomen and Pelvis with contrast



HISTORY:

abd pain



COMPARISON:

None.



TECHNIQUE:

Contrast dose: 



Radiation dose:



Total exam DLP = 219.96 mGy-cm.



This CT exam was performed using one or more of the following dose 

reduction techniques: Automated exposure control, adjustment of the 

mA and/or kV according to patient size, and/or use of iterative 

reconstruction technique.



FINDINGS:



LOWER THORAX:

Unremarkable. 



LIVER:

Unremarkable. No gross lesion or ductal dilatation. 



GALLBLADDER AND BILE DUCTS:

Unremarkable. 



PANCREAS:

Unremarkable. No gross lesion or ductal dilatation.



SPLEEN:

Unremarkable. 



ADRENALS:

Unremarkable. No mass. 



KIDNEYS AND URETERS:

Unremarkable. No hydronephrosis. No solid mass. 



VASCULATURE:

Unremarkable. No aortic aneurysm. No aortic atherosclerotic 

calcification or mural plaque present.



BOWEL:

Unremarkable. No obstruction. No gross mural thickening. There is 

moderate constipation



APPENDIX:

Normal appendix. 



PERITONEUM:

Unremarkable. No free fluid. No free air. 



LYMPH NODES:

Unremarkable. No enlarged lymph nodes. 



BLADDER:

Unremarkable. 



REPRODUCTIVE:

Unremarkable. 



BONES:

No acute fracture. 



OTHER FINDINGS:

None.



IMPRESSION:

No acute intra-abdominal findings.



Moderate constipation

## 2019-01-05 NOTE — ED PDOC
Arrival/HPI





- General


Chief Complaint: Abdominal Pain


Time Seen by Provider: 01/05/19 10:36


Historian: Patient





- History of Present Illness


Narrative History of Present Illness (Text): 





01/05/19 11:12


43-year-old female presents today with a 2-3 day history of worsening sharp 

stabbing epigastric pain. Patient states prior to the onset of pain she was 

taking multiple Excedrin for headaches. Patient states initially she thought the

pain was heartburn and she described a feeling burning going up into the chest. 

Patient states over the past 2 days the pain has worsened. Patient states she's 

been having nausea and multiple episodes of vomiting. No diarrhea. Patient 

states she has not been able to eat because she is afraid the pain worsened. She

is complaining of sharp back pain. She denies dysuria or urinary frequency. She 

denies chest pain or shortness of breath. Patient denies headache at present 

time. No neck pain.





Past Medical History





- Provider Review


Nursing Documentation Reviewed: Yes





- Travel History


Have you recently traveled outside [place] w/in the past 3 mons?: No





- Infectious Disease


Hx of Infectious Diseases: None





- Tetanus Immunization


Tetanus Immunization: Unknown





- Reproductive


Menopause: No





- Past Medical History


Past Medical History: No Previous





- Cardiac


Hx Hypertension: No





- Pulmonary


Hx Tuberculosis: No





- Neurological


Hx Seizures: No





- HEENT


Hx HEENT Disorder: No


Hx Blind: No





- Renal


Hx Renal Disorder: No





- Endocrine/Metabolic


Hx Endocrine Disorders: No





- Hematological/Oncological


Hx Cancer: No





- Integumentary


Hx Dermatological Disorder: No





- Musculoskeletal/Rheumatological


Hx Falls: No





- Gastrointestinal


Hx Gastrointestinal Disorders: No





- Genitourinary/Gynecological


Hx Sexually Transmitted Diseases: No





- Psychiatric


Hx Anxiety: Yes


Hx Depression: Yes


Hx Substance Use: Yes (stopped)





- Past Surgical History


Past Surgical History: No Previous





- Anesthesia


Hx Anesthesia: No





- Suicidal Assessment


Feels Threatened In Home Enviroment: No





Family/Social History





- Physician Review


Nursing Documentation Reviewed: Yes


Family/Social History: Unknown Family HX


Smoking Status: Current Some Days Smoker


Hx Alcohol Use: No


Hx Substance Use: Yes (stopped)


Substance used: heroine


Hx Substance Use Treatment: No





Allergies/Home Meds


Allergies/Adverse Reactions: 


Allergies





No Known Allergies Allergy (Verified 10/22/18 11:15)


   











Review of Systems





- Review of Systems


Constitutional: absent: Fatigue, Fevers


Respiratory: absent: SOB, Cough


Cardiovascular: absent: Chest Pain, Palpitations


Gastrointestinal: Abdominal Pain, Nausea, Vomiting.  absent: Constipation, 

Diarrhea


Genitourinary Female: absent: Dysuria, Frequency, Hematuria


Musculoskeletal: Back Pain.  absent: Arthralgias, Neck Pain


Skin: absent: Rash, Pruritis


Neurological: absent: Headache, Dizziness


Psychiatric: absent: Depression, Suicidal Ideation





Physical Exam


Vital Signs Reviewed: Yes





Vital Signs











  Temp Pulse Resp BP Pulse Ox


 


 01/05/19 11:02  98 F  55 L  19  148/78  99


 


 01/05/19 10:32  97.9 F  59 L  18  115/81  98











Temperature: Afebrile


Blood Pressure: Normal


Pulse: Bradycardic


Respiratory Rate: Normal


Appearance: Positive for: Well-Appearing, Non-Toxic, Comfortable


Pain Distress: None


Mental Status: Positive for: Alert and Oriented X 3





- Systems Exam


Head: Present: Atraumatic


Mouth: Present: Moist Mucous Membranes


Neck: Present: Normal Range of Motion


Respiratory/Chest: Present: Clear to Auscultation, Good Air Exchange.  No: 

Respiratory Distress, Accessory Muscle Use


Cardiovascular: Present: Regular Rate and Rhythm, Normal S1, S2.  No: Murmurs


Abdomen: Present: Tenderness (+ epigastric tenderness).  No: Distention, 

Peritoneal Signs, Rebound, Guarding, Hernias


Back: Present: Normal Inspection.  No: CVA Tenderness, Midline Tenderness, 

Paraspinal Tenderness


Upper Extremity: Present: Normal ROM


Lower Extremity: Present: Normal ROM


Neurological: Present: GCS=15, Speech Normal


Skin: Present: Warm, Dry, Normal Color.  No: Rashes


Psychiatric: Present: Alert, Oriented x 3





Medical Decision Making


ED Course and Treatment: 





01/05/19 11:49


Patient is nontoxic well appearing with stable vital signs presenting with 

[severe] abdominal pain





CBC wnl


CMP wnl


Lipase wnl





Urinalysis wnl





Ultrasound:FINDINGS:


LIVER:


Measures 15 cm in sagittal dimension and appears within normal limits of size, 

shape, and echotexture.  No focal hepatic mass identified. The main portal vein 

appears patent with normal directional flow.   No intrahepatic bile duct 

dilatation.


GALLBLADDER:


No gallstones. No gallbladder wall thickening. Negative sonographic Serra's 

sign as assessed by the sonographer.


COMMON BILE DUCT:


Measures 3 mm. 


PANCREAS:


Not well visualized.


RIGHT KIDNEY:


Measures 10.6 x 3.5 x 5.1 cm.  No obstructing calculus or hydronephrosis 

identified. 


LEFT KIDNEY:


Measures 10.4 x 4.8 x 5.2 cm.  No obstructing calculus or hydronephrosis 

identified. 


SPLEEN:


Measures approximately 9.4 cm. 


AORTA:


Limited views appear unremarkable. 


IVC:


Limited views appear unremarkable. 


OTHER FINDINGS:


None. 


IMPRESSION:


Unremarkable abdominal sonogram with findings as above





CAT scan:FINDINGS:


LOWER THORAX:


Unremarkable. 


LIVER:


Unremarkable. No gross lesion or ductal dilatation. 


GALLBLADDER AND BILE DUCTS:


Unremarkable. 


PANCREAS:


Unremarkable. No gross lesion or ductal dilatation.


SPLEEN:


Unremarkable. 


ADRENALS:


Unremarkable. No mass. 


KIDNEYS AND URETERS:


Unremarkable. No hydronephrosis. No solid mass. 


VASCULATURE:


Unremarkable. No aortic aneurysm. No aortic atherosclerotic calcification or 

mural plaque present.


BOWEL:


Unremarkable. No obstruction. No gross mural thickening. There is moderate 

constipation


APPENDIX:


Normal appendix. 


PERITONEUM:


Unremarkable. No free fluid. No free air. 


LYMPH NODES:


Unremarkable. No enlarged lymph nodes. 


BLADDER:


Unremarkable. 


REPRODUCTIVE:


Unremarkable. 


BONES:


No acute fracture. 


OTHER FINDINGS:


None.


IMPRESSION:


No acute intra-abdominal findings.


Moderate constipation





cxr; wnl





Patient reassessment: pt feeling better











Discussed all results with patient in depth. Advised avoiding NSAIDs, advised 

avoiding coffee, spicy foods, caffeine, chocolate, Pasta sauce.





Advised follow-up with the GI specialist within the next 2 days taking Pepcid 

daily and return immediately if symptoms worsen persist or if new concerning sy

mptoms develop





Patient verbalizes understanding of discharge instructions and need for 

immediate followup.








all aspects of this case were discussed the attending of record. 





Impression: Abdominal pain


tylenol every 6 hours as needed for pain. 


Pepcid one tablet daily


Follow up with primary care physician within the next 2 days


Follow up with the GI specialist within the next 2 days. 


Return immediately if symptoms worsen persist or if new symptoms develop: High 

fevers, increasing pain, vomiting, diarrhea or any other concerning symptoms 

develop


01/05/19 13:36





Reassessment Condition: Re-examined, Improved





- Lab Interpretations


Lab Results: 











                                 01/05/19 10:45 





                                   Lab Results





01/05/19 10:59: Urine Color Yellow, Urine Appearance Clear, Urine pH 8.0, Ur 

Specific Gravity 1.025, Urine Protein Trace H, Urine Glucose (UA) Negative, 

Urine Ketones Negative, Urine Blood Negative, Urine Nitrate Negative, Urine 

Bilirubin Negative, Urine Urobilinogen 0.2, Ur Leukocyte Esterase Negative, 

Urine RBC Pending, Urine WBC Pending, Urine HCG, Qual Pending


01/05/19 10:45: WBC 12.9 H, RBC 4.62, Hgb 14.6, Hct 43.2, MCV 93.5, MCH 31.6, M

CHC 33.8, RDW 12.1, Plt Count 272, MPV 10.4, Gran % 74.1 H, Lymph % (Auto) 18.1 

L, Mono % (Auto) 6.1 H, Eos % (Auto) 1.2 L, Baso % (Auto) 0.5, Gran # 9.53 H, 

Lymph # (Auto) 2.3, Mono # (Auto) 0.8 H, Eos # (Auto) 0.2, Baso # (Auto) 0.06











- RAD Interpretation


Radiology Orders: 











01/05/19 11:06


ABDOMEN COMPLETE [US] Stat 














- Medication Orders


Current Medication Orders: 














Discontinued Medications





Ondansetron HCl (Zofran Inj)  4 mg IVP STAT STA


   Stop: 01/05/19 11:06


   Last Admin: 01/05/19 11:10  Dose: 4 mg





IVP Administration


 Document     01/05/19 11:10  [facility]  (Rec: 01/05/19 11:11  [facility]  [id number]


     Charges for Administration


      # of IVP Administrations                   1





Pantoprazole Sodium (Protonix Inj)  40 mg IVP STAT STA


   Stop: 01/05/19 11:06











Disposition/Present on Arrival





- Present on Arrival


Any Indicators Present on Arrival: No


History of DVT/PE: No


History of Uncontrolled Diabetes: No


Urinary Catheter: No


History of Decub. Ulcer: No


History Surgical Site Infection Following: None





- Disposition


Have Diagnosis and Disposition been Completed?: Yes


Diagnosis: 


 Abdominal pain, Constipation





Disposition: HOME/ ROUTINE


Disposition Time: 12:30


Patient Plan: Discharge


Condition: GOOD


Discharge Instructions (ExitCare):  Constipation, Adult (DC), Constipation in 

Adults, Acute Abdomen (Belly Pain), Adult (DC)


Additional Instructions: 


tylenol every 6 hours as needed for pain. 


Pepcid one tablet daily


Follow up with primary care physician within the next 2 days


Follow up with the GI specialist within the next 2 days. 


Return immediately if symptoms worsen persist or if new symptoms develop: High 

fevers, increasing pain, vomiting, diarrhea or any other concerning symptoms 

develop


Prescriptions: 


Famotidine [Pepcid] 20 mg PO DAILY #30 tab


Referrals: 


Jacquelyn Santana MD [Medical Doctor] - Follow up with primary


Negra Rosario MD [Medical Doctor] - Follow up with primary


 Service [Outside] - Follow up with primary


Forms:  [facility] (English), WORK NOTE

## 2019-01-05 NOTE — CARD
--------------- APPROVED REPORT --------------





Date of service: 01/05/2019



EKG Measurement

Heart [id number]

MI [id number]

[id number]

[id number]

[id number]



<Conclusion>

Sinus bradycardia

Otherwise normal ECG

## 2019-01-05 NOTE — US
HISTORY:

epigastric abd pain



COMPARISON:

None available.



TECHNIQUE:

Sonographic evaluation of the abdomen.



FINDINGS:



LIVER:

Measures 15 cm in sagittal dimension and appears within normal limits 

of size, shape, and echotexture.  No focal hepatic mass identified. 

The main portal vein appears patent with normal directional flow.   

No intrahepatic bile duct dilatation.



GALLBLADDER:

No gallstones. No gallbladder wall thickening. Negative sonographic 

Serra's sign as assessed by the sonographer.



COMMON BILE DUCT:

Measures 3 mm. 



PANCREAS:

Not well visualized.



RIGHT KIDNEY:

Measures 10.6 x 3.5 x 5.1 cm.  No obstructing calculus or 

hydronephrosis identified. 



LEFT KIDNEY:

Measures 10.4 x 4.8 x 5.2 cm.  No obstructing calculus or 

hydronephrosis identified. 



SPLEEN:

Measures approximately 9.4 cm. 



AORTA:

Limited views appear unremarkable. 



IVC:

Limited views appear unremarkable. 



OTHER FINDINGS:

None. 



IMPRESSION:

Unremarkable abdominal sonogram with findings as above.

## 2019-01-15 ENCOUNTER — HOSPITAL ENCOUNTER (EMERGENCY)
Dept: HOSPITAL 14 - H.ER | Age: 44
Discharge: HOME | End: 2019-01-15
Payer: COMMERCIAL

## 2019-01-15 VITALS
TEMPERATURE: 98.7 F | RESPIRATION RATE: 16 BRPM | SYSTOLIC BLOOD PRESSURE: 103 MMHG | OXYGEN SATURATION: 99 % | HEART RATE: 76 BPM | DIASTOLIC BLOOD PRESSURE: 68 MMHG

## 2019-01-15 VITALS — BODY MASS INDEX: 17.4 KG/M2

## 2019-01-15 DIAGNOSIS — F32.9: Primary | ICD-10-CM

## 2019-01-15 NOTE — ED PDOC
HPI: General Adult


Time Seen by Provider: 01/15/19 15:30


Chief Complaint (Nursing): Psychiatric Evaluation


Chief Complaint (Provider): Medication Refill


History Per: Patient


History/Exam Limitations: no limitations


Onset/Duration Of Symptoms: Days (4)


Current Symptoms Are (Timing): Better


Severity: Mild


Additional Complaint(s): 


44 y/o female presents to the ED for medication refill. Patient states she 

completed Escitalopram 5mg 4 days ago. Last night she felt anxious and she did 

not feel like herself at work. Patient was diagnosed with depression at [facility]. Otherwise she denies fever, cough, shortness of breath, suicidal or 

homicidal ideation. 


PMD: none provided  











Past Medical History


Reviewed: Historical Data, Nursing Documentation, Vital Signs


Vital Signs: 





                                Last Vital Signs











Temp  98.7 F   01/15/19 14:23


 


Pulse  76   01/15/19 14:23


 


Resp  16   01/15/19 14:23


 


BP  103/68   01/15/19 14:23


 


Pulse Ox  99   01/15/19 14:23














- Medical History


PMH: Anxiety, Depression


   Denies: Diabetes, Hepatitis, HIV, HTN, End Stage Renal Disease, Chronic 

Kidney Disease, Seizures, Sexually Transmitted Disease





- Family History


Family History: States: Diabetes





- Immunization History


Hx Tetanus Toxoid Vaccination: No


Hx Influenza Vaccination: No


Hx Pneumococcal Vaccination: No





- Home Medications


Home Medications: 


                                Ambulatory Orders











 Medication  Instructions  Recorded


 


Escitalopram [Lexapro] 5 mg PO DAILY #30 tab 12/08/18


 


Famotidine [Pepcid] 20 mg PO DAILY #30 tab 01/05/19


 


RX: Escitalopram [Lexapro] 1 tab PO DAILY #30 tab 01/15/19














- Allergies


Allergies/Adverse Reactions: 


                                    Allergies











Allergy/AdvReac Type Severity Reaction Status Date / Time


 


No Known Allergies Allergy   Verified 01/15/19 14:23














Review of Systems


ROS Statement: Except As Marked, All Systems Reviewed And Found Negative


Constitutional: Negative for: Fever, Chills


Skin: Negative for: Rash


Neurological: Negative for: Weakness, Numbness


Psych: Negative for: Suicidal ideation, Other (homicidal ideation)





Physical Exam





- Reviewed


Nursing Documentation Reviewed: Yes


Vital Signs Reviewed: Yes





- Physical Exam


Appears: Positive for: Well, Non-toxic, No Acute Distress


Head Exam: Positive for: ATRAUMATIC, NORMAL INSPECTION, NORMOCEPHALIC


Skin: Positive for: Normal Color, Warm, Dry.  Negative for: Rash


Eye Exam: Positive for: EOMI, Normal appearance, PERRL


Cardiovascular/Chest: Positive for: Regular Rate, Rhythm.  Negative for: Murmur


Respiratory: Positive for: Normal Breath Sounds.  Negative for: Decreased Breath

 Sounds, Wheezing, Respiratory Distress


Neurologic/Psych: Positive for: Alert, Oriented (x3).  Negative for: 

Motor/Sensory Deficits





- ECG


O2 Sat by Pulse Oximetry: 99 (RA)


Pulse Ox Interpretation: Normal





Medical Decision Making


Medical Decision Making: 


Time: 1530





 

--------------------------------------------------------------------------------


----------------- 


Scribe Attestation:


Documented by Tamica Winston, acting as a scribe for Wilton Owens PA-C.


 


Provider Scribe Attestation:


All medical record entries made by the Scribe were at my direction and pers

onally dictated by me. I have reviewed the chart and agree that the record 

accurately reflects my personal performance of the history, physical exam, 

medical decision making, and the department course for this patient. I have also

 personally directed, reviewed, and agree with the discharge instructions and 

disposition.














Disposition





- Clinical Impression


Clinical Impression: 


 Depression








- Patient ED Disposition


Is Patient to be Admitted: No


Doctor Will See Patient In The: Office


Counseled Patient/Family Regarding: Diagnosis, Rx Given





- Disposition


Referrals: 


[facility] [Outside]


Disposition: Routine/Home


Disposition Time: 15:51


Condition: STABLE


Prescriptions: 


RX: Escitalopram [Lexapro] 1 tab PO DAILY #30 tab


Instructions:  Depression, Depression, Adult (DC)


Forms:  [facility] (English)

## 2019-03-31 NOTE — PCM.PSYCH
Initial Psychiatric Evaluation





- Initial Psychiatric Evaluation


Type of Admission: Voluntary


Legal Status: Capacity


Chief Complaint (in patient's own words): 





"I need detox"


History of Present Illness and Precipitating Events: 





The patient is seen, chart reviewed and case discussed.


This is a 43-year-old  female, homeless and unemployed, she is  

but  for 5 years, however she was living with her ex- up until 

last weekend when he left for [facility].


The patient is also from [facility] and came here 15 years ago. She doesn't have a 

child.





The patient is here for heroin detox; using 20 bags intranasally for the past 

10 years. She had one detox in the past but never been to rehabilitation or  

or used MAT.


She is also using crack cocaine the last 2 or 3 weeks but denies alcohol, 

cannabis and all other drugs. She is positive for benzos but she denies using, 

however she was given Xanax for anxiety but she says she stopped a month ago.





She says she couldn't stop on her own and kept on relapsing.





Past psych history: Depression and anxiety. No admissions and no suicide 

attempts





Medical history: Denies. She takes insulin for tooth abscess





Family psych history: Uncle had substance use and committed suicide when 

patient was a child


Current Medications: 





Active Medications











Generic Name Dose Route Start Last Admin





  Trade Name Freq  PRN Reason Stop Dose Admin


 


Amoxicillin/Clavulanate Potassium  1 tab  06/05/18 19:00  





  Augmentin 500 Mg-125 Mg Tab  PO  06/10/18 19:01  





  Q12H SAMMY   





  Protocol   


 


Clonidine HCl  0.1 mg  06/05/18 12:50  





  Catapres  PO   





  Q8 PRN   





  COWS Score More or Equal to 5   


 


Escitalopram Oxalate  5 mg  06/05/18 13:00  





  Lexapro  PO   





  DAILY SAMMY   


 


Gabapentin  100 mg  06/05/18 14:00  





  Neurontin  PO   





  TID SAMMY   


 


Hydroxyzine HCl  25 mg  06/05/18 12:53  





  Atarax  PO   





  Q4H PRN   





  Anxiety   


 


Ibuprofen  600 mg  06/05/18 12:53  





  Motrin Tab  PO   





  Q6H PRN   





  Pain, moderate (4-7)   


 


Loperamide HCl  2 mg  06/05/18 12:50  





  Imodium  PO   





  Q8 PRN   





  Diarrhea   


 


Methadone HCl  25 mg  06/06/18 13:00  





  Methadone  PO  06/06/18 13:01  





  ONCE ONE   


 


Ondansetron HCl  4 mg  06/05/18 12:50  





  Zofran Tab  PO   





  Q8 PRN   





  Nausea/Vomiting   














Past Psychiatric History





- Past Psychiatric History


Previous Treatment History: None


Pertinent Medical Hx (Current Medical&Sleep Prob, Allergies): 





 Allergies











Allergy/AdvReac Type Severity Reaction Status Date / Time


 


No Known Allergies Allergy   Verified 06/05/18 09:25








 





Ibuprofen [Motrin] 600 mg PO Q6 06/05/18 


Penicillin VK [Penicillin VK Tab] 500 mg PO TID 06/05/18 











Review of Systems





- Neurological


Neurological: UNREMARKABLE





- Psychiatric


Psychiatric: Abnormal Sleep Pattern, Anhedonia, Anxiety, Change in Appetite, 

Depression, Difficulty Concentrating.  absent: Hallucinations, Homicidal 

Ideation, Hopelessness, Paranoia, Suicidal Ideation





Mental Status Examination





- Personal Presentation


Personal Presentation: Looks stated age





- Affect


Affect: Broad





- Motor Activity


Motor Activity: Calm





- Reliability in Providing Information


Reliability in Providing Information: Good





- Speech


Speech: Organized





- Mood


Mood: Depressed, Anxious





- Formal Thought Process


Formal Thought Process: No Impairment





- Cognitive Functions


Orientation: Person, Place, Situation, Time


Sensorium: Alert


Attention/Concentration: Attentive


Estimate of Intelligence: Average


Judgement: Intact, as evidence by: Insight regarding need for hospitalization


Memory: Recent intact, as evidence by: Ability to recall events of the day, 

Remote intact, as evidenced by: Abilit to recall sig. life events





- Risk


Risk: Withdrawal, Diminished functioning





- Strength & Assets Inventory


Strength & Assets Inventory: Cooperative





- Limitations


Limitations: Living alone





DSM 5 DX





- DSM 5


DSM 5 Diagnosis: 


Opioid withdrawal


Opioid use disorder, severe


Cocaine use disorder, severe


Major depressive disorder, moderate


Generalized anxiety disorder





- Recommended/Plan of Treatment


Treatment Recommendations and Plan of Treatment: 





Taper with methadone


Gabapentin for augmentation 


Lexapro for FILOMENA and depression


CBT and supportive therapy for FILOMENA and depression


As needed medications


All risks, benefits and alternatives of the meds discussed,


 and the pt agreed and understood. 


Attend groups and activities


Supportive therapy and psychoeducation for substance use


MI for abstinence


CBT for relapse prevention


Encourage MAT


Refer to rehab or IOP, and self-help groups





34 min


Projected ELOS: 5-6 days


Prognosis: Good with treated





- Smoking Cessation


Smoking Cessation Initiated: No


Reason for not providing: Nonsmoker In no apparent distress, appears well developed and well nourished. Cries on exam.